# Patient Record
Sex: FEMALE | Race: WHITE | NOT HISPANIC OR LATINO | Employment: OTHER | URBAN - METROPOLITAN AREA
[De-identification: names, ages, dates, MRNs, and addresses within clinical notes are randomized per-mention and may not be internally consistent; named-entity substitution may affect disease eponyms.]

---

## 2017-01-03 ENCOUNTER — TRANSCRIBE ORDERS (OUTPATIENT)
Dept: ADMINISTRATIVE | Facility: HOSPITAL | Age: 67
End: 2017-01-03

## 2017-01-03 DIAGNOSIS — R52 PAIN: Primary | ICD-10-CM

## 2017-01-03 DIAGNOSIS — R60.9 SWELLING: ICD-10-CM

## 2017-01-05 ENCOUNTER — HOSPITAL ENCOUNTER (OUTPATIENT)
Dept: RADIOLOGY | Facility: HOSPITAL | Age: 67
Discharge: HOME | End: 2017-01-05
Attending: INTERNAL MEDICINE
Payer: COMMERCIAL

## 2017-01-05 DIAGNOSIS — R52 PAIN: ICD-10-CM

## 2017-01-05 DIAGNOSIS — R60.9 SWELLING: ICD-10-CM

## 2017-01-05 PROCEDURE — 93970 EXTREMITY STUDY: CPT

## 2017-12-27 ENCOUNTER — TRANSCRIBE ORDERS (OUTPATIENT)
Dept: ADMINISTRATIVE | Facility: HOSPITAL | Age: 67
End: 2017-12-27

## 2017-12-27 DIAGNOSIS — I87.2 VENOUS INSUFFICIENCY: Primary | ICD-10-CM

## 2018-01-10 ENCOUNTER — HOSPITAL ENCOUNTER (OUTPATIENT)
Dept: RADIOLOGY | Facility: HOSPITAL | Age: 68
Discharge: HOME | End: 2018-01-10
Payer: COMMERCIAL

## 2018-01-10 ENCOUNTER — GENERIC CONVERSION - ENCOUNTER (OUTPATIENT)
Dept: OTHER | Facility: OTHER | Age: 68
End: 2018-01-10

## 2018-01-10 DIAGNOSIS — I87.2 VENOUS INSUFFICIENCY: ICD-10-CM

## 2018-01-10 PROCEDURE — 93970 EXTREMITY STUDY: CPT

## 2021-04-08 DIAGNOSIS — Z23 ENCOUNTER FOR IMMUNIZATION: ICD-10-CM

## 2021-10-28 ENCOUNTER — TELEPHONE (OUTPATIENT)
Dept: GASTROENTEROLOGY | Facility: CLINIC | Age: 71
End: 2021-10-28

## 2021-11-16 ENCOUNTER — IMMUNIZATIONS (OUTPATIENT)
Dept: FAMILY MEDICINE CLINIC | Facility: HOSPITAL | Age: 71
End: 2021-11-16

## 2021-11-16 DIAGNOSIS — Z23 ENCOUNTER FOR IMMUNIZATION: Primary | ICD-10-CM

## 2021-11-16 PROCEDURE — 0064A COVID-19 MODERNA VACC 0.25 ML BOOSTER: CPT

## 2021-11-16 PROCEDURE — 91306 COVID-19 MODERNA VACC 0.25 ML BOOSTER: CPT

## 2022-07-12 ENCOUNTER — TELEPHONE (OUTPATIENT)
Dept: DERMATOLOGY | Facility: CLINIC | Age: 72
End: 2022-07-12

## 2022-09-14 ENCOUNTER — APPOINTMENT (OUTPATIENT)
Dept: RADIOLOGY | Facility: HOSPITAL | Age: 72
End: 2022-09-14
Payer: COMMERCIAL

## 2022-09-14 ENCOUNTER — HOSPITAL ENCOUNTER (EMERGENCY)
Facility: HOSPITAL | Age: 72
Discharge: HOME | End: 2022-09-14
Attending: ORTHOPAEDIC SURGERY
Payer: COMMERCIAL

## 2022-09-14 ENCOUNTER — HOSPITAL ENCOUNTER (EMERGENCY)
Facility: HOSPITAL | Age: 72
End: 2022-09-14
Attending: EMERGENCY MEDICINE
Payer: COMMERCIAL

## 2022-09-14 VITALS
SYSTOLIC BLOOD PRESSURE: 139 MMHG | HEART RATE: 72 BPM | DIASTOLIC BLOOD PRESSURE: 65 MMHG | TEMPERATURE: 97.6 F | RESPIRATION RATE: 19 BRPM | OXYGEN SATURATION: 98 %

## 2022-09-14 VITALS
OXYGEN SATURATION: 92 % | TEMPERATURE: 98.1 F | SYSTOLIC BLOOD PRESSURE: 185 MMHG | HEART RATE: 70 BPM | DIASTOLIC BLOOD PRESSURE: 81 MMHG | RESPIRATION RATE: 20 BRPM

## 2022-09-14 DIAGNOSIS — S62.109A WRIST FRACTURE: Primary | ICD-10-CM

## 2022-09-14 PROCEDURE — 73110 X-RAY EXAM OF WRIST: CPT

## 2022-09-14 PROCEDURE — 99285 EMERGENCY DEPT VISIT HI MDM: CPT | Performed by: PHYSICIAN ASSISTANT

## 2022-09-14 PROCEDURE — 96375 TX/PRO/DX INJ NEW DRUG ADDON: CPT

## 2022-09-14 PROCEDURE — 29125 APPL SHORT ARM SPLINT STATIC: CPT | Performed by: PHYSICIAN ASSISTANT

## 2022-09-14 PROCEDURE — 96361 HYDRATE IV INFUSION ADD-ON: CPT

## 2022-09-14 PROCEDURE — 99284 EMERGENCY DEPT VISIT MOD MDM: CPT

## 2022-09-14 PROCEDURE — 96374 THER/PROPH/DIAG INJ IV PUSH: CPT

## 2022-09-14 PROCEDURE — 96376 TX/PRO/DX INJ SAME DRUG ADON: CPT

## 2022-09-14 PROCEDURE — NC001 PR NO CHARGE: Performed by: ORTHOPAEDIC SURGERY

## 2022-09-14 RX ORDER — HYDROMORPHONE HCL/PF 1 MG/ML
1 SYRINGE (ML) INJECTION ONCE
Status: COMPLETED | OUTPATIENT
Start: 2022-09-14 | End: 2022-09-14

## 2022-09-14 RX ORDER — ONDANSETRON 2 MG/ML
4 INJECTION INTRAMUSCULAR; INTRAVENOUS ONCE
Status: COMPLETED | OUTPATIENT
Start: 2022-09-14 | End: 2022-09-14

## 2022-09-14 RX ORDER — HYDROMORPHONE HCL/PF 1 MG/ML
0.5 SYRINGE (ML) INJECTION ONCE
Status: COMPLETED | OUTPATIENT
Start: 2022-09-14 | End: 2022-09-14

## 2022-09-14 RX ORDER — OXYCODONE HYDROCHLORIDE 10 MG/1
10 TABLET ORAL EVERY 4 HOURS PRN
Status: DISCONTINUED | OUTPATIENT
Start: 2022-09-14 | End: 2022-09-14 | Stop reason: HOSPADM

## 2022-09-14 RX ORDER — LIDOCAINE HYDROCHLORIDE 10 MG/ML
10 INJECTION, SOLUTION EPIDURAL; INFILTRATION; INTRACAUDAL; PERINEURAL ONCE
Status: COMPLETED | OUTPATIENT
Start: 2022-09-14 | End: 2022-09-14

## 2022-09-14 RX ADMIN — ONDANSETRON 4 MG: 2 INJECTION INTRAMUSCULAR; INTRAVENOUS at 11:10

## 2022-09-14 RX ADMIN — HYDROMORPHONE HYDROCHLORIDE 1 MG: 1 INJECTION, SOLUTION INTRAMUSCULAR; INTRAVENOUS; SUBCUTANEOUS at 10:04

## 2022-09-14 RX ADMIN — ONDANSETRON 4 MG: 2 INJECTION INTRAMUSCULAR; INTRAVENOUS at 11:44

## 2022-09-14 RX ADMIN — LIDOCAINE HYDROCHLORIDE 10 ML: 10 INJECTION, SOLUTION EPIDURAL; INFILTRATION; INTRACAUDAL; PERINEURAL at 13:35

## 2022-09-14 RX ADMIN — SODIUM CHLORIDE 1000 ML: 0.9 INJECTION, SOLUTION INTRAVENOUS at 10:07

## 2022-09-14 RX ADMIN — HYDROMORPHONE HYDROCHLORIDE 0.5 MG: 1 INJECTION, SOLUTION INTRAMUSCULAR; INTRAVENOUS; SUBCUTANEOUS at 12:24

## 2022-09-14 RX ADMIN — OXYCODONE HYDROCHLORIDE 10 MG: 10 TABLET ORAL at 16:18

## 2022-09-14 NOTE — ED NOTES
Patient is leaving with an IV access in place on the left hand,  Transport made aware that the receiving RN is aware that patient will be given pain med through IV before leaving [facility]   Daughter is also aware of the transfer  Report given to Joel Ruiz RN at [facility] AND [facility] ED       Andreas Caballero RN  09/14/22 8182

## 2022-09-14 NOTE — CONSULTS
Orthopedics   Marielos Gleason 71 y o  female MRN: [id number]  Unit/Bed#: ED 27      Chief Complaint:   right wrist pain    HPI:   71 y o  right hand dominant female status post fall complaining of right wrist pain  Patient was in her house this morning, when she had a mechanical fall, lost her balance, and fell onto her right side  Patient had her right arm outstretched to break the fall  In doing so, patient experienced a fracture of her distal radius and ulna  Patient was subsequently unable to bear weight on her right upper extremity  Denies any numbness or tingling  Patient does not take any blood thinners  No pertinent past medical history  Review Of Systems:   · Skin: per HPI and Physical Exam  · Neuro: See HPI  · Musculoskeletal: See HPI  · 14 point review of systems negative except as stated above     Past Medical History:   Past Medical History:   Diagnosis Date    Arthritis        Past Surgical History:   Past Surgical History:   Procedure Laterality Date    HYSTERECTOMY         Family History:  Family history reviewed and non-contributory  History reviewed  No pertinent family history  Social History:  Social History     Socioeconomic History    Marital status: /Civil Union     Spouse name: None    Number of children: None    Years of education: None    Highest education level: None   Occupational History    None   Tobacco Use    Smoking status: Never Smoker    Smokeless tobacco: Never Used   Vaping Use    Vaping Use: Never used   Substance and Sexual Activity    Alcohol use:  Yes     Alcohol/week: 2 0 standard drinks     Types: 2 Glasses of wine per week    Drug use: Never    Sexual activity: Never   Other Topics Concern    None   Social History Narrative    None     Social Determinants of Health     Financial Resource Strain: Not on file   Food Insecurity: Not on file   Transportation Needs: Not on file   Physical Activity: Not on file   Stress: Not on file   Social Connections: Not on file   Intimate Partner Violence: Not on file   Housing Stability: Not on file       Allergies:   No Known Allergies        Labs:  No results found for: HCT, HGB, PT, INR, WBC, ESR, CRP    Meds:  No current facility-administered medications for this encounter  No current outpatient medications on file  Blood Culture:   No results found for: BLOODCX    Wound Culture:   No results found for: WOUNDCULT    Ins and Outs:  No intake/output data recorded  Physical Exam:   There were no vitals taken for this visit  Gen: No acute distress, resting comfortably in bed  HEENT: Eyes clear, moist mucus membranes, hearing intact  Respiratory: No audible wheezing or stridor  Cardiovascular: Well Perfused peripherally  Abdomen: nondistended, no peritoneal signs  Musculoskeletal: right upper extremity  · Skin intact  · Visible deformity  · Edema present to affected area   · Tender to palpation over forearm  · Sensation intact to median, radial, and ulnar distributions  · Motor intact to ain/pin/m/r/u  · Finger tips warm and well perfused  · Musculature is soft and compressible    Radiology:   I personally reviewed the films  X-rays AP/Lateral right wrist shows dorsally displaced distal radius and ulna fractures  Procedure: Distal radius reduction and splint application    A hematoma block was given with 20cc of 1% lidocaine without epi  Once adequate anesthesia was obtained, a gentle closed reduction maneuver was performed and pt was placed in a well padded sugartong splint  Pt tolerated the procedure well and was neurovascularly intact both pre and post procedure  Post reduction orthogonal x rays will be reviewed upon completion    Assessment:  71 y  o female S/P fall with right distal radius and ulna fractures  Patient can be discharged from the ED and follow up in 1 week with Dr Tatianna Lynn for discussion of potential operative vs  nonoperative treatment       Plan:   · Non weight bearing right upper extremity in sugartong splint  · Discharged from ED with oral pain meds and instruction to f/u with Dr Kwabena Martinez in 1 week   · Instructed to return to ED if new numbness or tingling in fingers, pain that is out of control despite oral pain meds, or if fingers turn pale and cold  · Analgesics for pain   · Ice and elevation  · Dispo: Ok for discharge from [address] [place] [address] Rd, MD

## 2022-09-14 NOTE — ED PROVIDER NOTES
History  Chief Complaint   Patient presents with    Wrist Injury     Pt tripped and fell this morning, injuring right wrist   Denies LOC, denies hitting her head, denies blood thinners      80-year-old female presenting today for evaluation of right-sided wrist pain after she had a trip and fall this morning onto outstretched hands  Did not injure any other portion of her body that she knows of  Not hit her head lose consciousness  Is not on any blood thinners  Notes some numbness and tingling to the right fingers, primarily the right 2nd and 3rd digits  Pain with movement of fingers  None       History reviewed  No pertinent past medical history  History reviewed  No pertinent surgical history  History reviewed  No pertinent family history  I have reviewed and agree with the history as documented  E-Cigarette/Vaping     E-Cigarette/Vaping Substances          Review of Systems   Constitutional: Negative  Negative for chills, fatigue and fever  HENT: Negative  Negative for congestion, postnasal drip, rhinorrhea and sore throat  Eyes: Negative  Respiratory: Negative  Negative for cough, shortness of breath and wheezing  Cardiovascular: Negative  Gastrointestinal: Negative  Negative for abdominal pain, diarrhea, nausea and vomiting  Endocrine: Negative  Genitourinary: Negative  Musculoskeletal: Positive for arthralgias and joint swelling  Negative for back pain, gait problem, myalgias, neck pain and neck stiffness  Skin: Negative  Neurological: Negative  Hematological: Negative  Psychiatric/Behavioral: Negative  All other systems reviewed and are negative  Physical Exam  Physical Exam  Vitals and nursing note reviewed  Constitutional:       Appearance: Normal appearance  HENT:      Head: Normocephalic and atraumatic        Right Ear: External ear normal       Left Ear: External ear normal       Nose: Nose normal    Eyes:      Conjunctiva/sclera: Conjunctivae normal    Cardiovascular:      Rate and Rhythm: Normal rate  Pulses: Normal pulses  Pulmonary:      Effort: Pulmonary effort is normal       Comments: S PO2 is 95% indicating adequate oxygenation  Abdominal:      General: There is no distension  Musculoskeletal:         General: No deformity  Normal range of motion  Arms:       Cervical back: Normal range of motion  Skin:     General: Skin is warm and dry  Capillary Refill: Capillary refill takes less than 2 seconds  Findings: No rash  Neurological:      General: No focal deficit present  Mental Status: She is alert and oriented to person, place, and time  Mental status is at baseline  Psychiatric:         Mood and Affect: Mood normal          Behavior: Behavior normal          Thought Content:  Thought content normal          Judgment: Judgment normal          Vital Signs  ED Triage Vitals [09/14/22 0950]   Temperature Pulse Respirations Blood Pressure SpO2   98 5 °F (36 9 °C) 74 20 151/74 95 %      Temp Source Heart Rate Source Patient Position - Orthostatic VS BP Location FiO2 (%)   Tympanic Monitor Sitting Left arm --      Pain Score       10 - Worst Possible Pain           Vitals:    09/14/22 0950 09/14/22 1147   BP: 151/74 (!) 185/81   Pulse: 74 70   Patient Position - Orthostatic VS: Sitting          Visual Acuity      ED Medications  Medications   HYDROmorphone (DILAUDID) injection 1 mg (1 mg Intravenous Given 9/14/22 1004)   sodium chloride 0 9 % bolus 1,000 mL (0 mL Intravenous Stopped 9/14/22 1129)   ondansetron (ZOFRAN) injection 4 mg (4 mg Intravenous Given 9/14/22 1110)   ondansetron (ZOFRAN) injection 4 mg (4 mg Intravenous Given 9/14/22 1144)   HYDROmorphone (DILAUDID) injection 0 5 mg (0 5 mg Intravenous Given 9/14/22 1224)       Diagnostic Studies  Results Reviewed     None                 XR wrist 3+ views RIGHT   Final Result by Amara Stokes MD (09/14 1037)      Acute distal radius and ulnar fractures with substantial dorsal displacement          Workstation performed: [id number]                    Procedures  Splint application    Date/Time: 9/14/2022 12:39 PM  Performed by: Elisa Norris PA-C  Authorized by: Elisa Norris PA-C   Universal Protocol:  Consent: Verbal consent obtained  Risks and benefits: risks, benefits and alternatives were discussed  Consent given by: patient  Time out: Immediately prior to procedure a "time out" was called to verify the correct patient, procedure, equipment, support staff and site/side marked as required  Timeout called at: 9/14/2022 12:39 PM   Patient understanding: patient states understanding of the procedure being performed  Patient consent: the patient's understanding of the procedure matches consent given  Procedure consent: procedure consent matches procedure scheduled  Relevant documents: relevant documents present and verified  Test results: test results available and properly labeled  Site marked: the operative site was marked  Radiology Images displayed and confirmed  If images not available, report reviewed: imaging studies available  Required items: required blood products, implants, devices, and special equipment available  Patient identity confirmed: verbally with patient      Pre-procedure details:     Sensation:  Unchanged  Procedure details:     Laterality:  Right    Location:  Wrist    Splint type:  Volar short arm    Supplies:  Cotton padding, elastic bandage and Ortho-Glass  Post-procedure details:     Pain:  Unchanged    Sensation:  Unchanged    Patient tolerance of procedure: Tolerated well, no immediate complications             ED Course  ED Course as of 09/14/22 1241   Wed Sep 14, 2022   [address] [place] [address] Spoke with Dr Sona Sanchez  Reaching out to Hand on call  Discussed with Dr Kishan Abbott      0 Spoke with Dr Ron Carpenter, he requests transfer    21 [phone] EMS pickup 12:30                               SBIRT 22yo+ Flowsheet Row Most Recent Value   SBIRT (25 yo +)    In order to provide better care to our patients, we are screening all of our patients for alcohol and drug use  Would it be okay to ask you these screening questions? No Filed at: 09/14/2022 1120                    [facility]  Number of Diagnoses or Management Options  Wrist fracture  Diagnosis management comments: Discussed case with Dr Emily Cummings who recommended hand  Discussed case with Dr Clark Hanley requested patient be transferred for evaluation at [facility] ED  Patient is in agreement with this  Volar splint loosely applied for stability  Discussed case with attending Dr Aranza Pandya  Amount and/or Complexity of Data Reviewed  Tests in the radiology section of CPT®: ordered and reviewed  Review and summarize past medical records: yes  Discuss the patient with other providers: yes (Dr Emily Cummings, Dr Clark Hanley, Dr Aranza Pandya )  Independent visualization of images, tracings, or specimens: yes        Disposition  Final diagnoses:   Wrist fracture     Time reflects when diagnosis was documented in both MDM as applicable and the Disposition within this note     Time User Action Codes Description Comment    9/14/2022 11:09 AM Sonya Aparicio Add [H30 109A] Wrist fracture       ED Disposition     ED Disposition   Transfer to Another Facility-In Network    Condition   --    Date/Time   Wed Sep 14, 2022 11:09 AM    Comment   Yenny Barahona should be transferred out to [facility] ED             MD Documentation    Sienna Pepper Most Recent Value   Patient Condition The patient has been stabilized such that within reasonable medical probability, no material deterioration of the patient condition or the condition of the unborn child(marcia) is likely to result from the transfer   Reason for Transfer Level of Care needed not available at this facility   Benefits of Transfer Specialized equipment and/or services available at the receiving facility (Include comment)________________________   Risks of Transfer Potential for delay in receiving treatment, Potential deterioration of medical condition, Loss of IV, Increased discomfort during transfer, Possible worsening of condition or death during transfer, Other: (Include comment)__________________________  Jeannett Papa or vascular injury]   Accepting Physician [facility]   Sending MD Dr Rickey Mensah [facility]   Provider Certification General risk, such as traffic hazards, adverse weather conditions, rough terrain or turbulence, possible failure of equipment (including vehicle or aircraft), or consequences of actions of persons outside the control of the transport personnel, Unanticipated needs of medical equipment and personnel during transport, Risk of worsening condition, The possibility of a transport vehicle being unavailable      RN Documentation    [facility]   Bed Assignment [facility] ED   Report Given to Merrick Porter RN   Medications Reviewed with Next Provider of Service No   Transport Mode Ambulance   Level of Care Basic life support   Copies of Medical Records Sent Other (comment)  [accepting facility will view it in epic]   Patient Belongings Disposition Sent with patient   Transfer Date 09/14/22   Transfer Time 1230      Follow-up Information    None         There are no discharge medications for this patient  No discharge procedures on file      PDMP Review     None          ED Provider  Electronically Signed by           Shelby Pichardo PA-C  09/14/22 HERIBERTO Juarez  09/14/22 1241

## 2022-09-14 NOTE — DISCHARGE INSTRUCTIONS
Discharge Instructions - Orthopedics  Girmagermain Del Rosario 71 y o  female MRN: [id number]  Unit/Bed#: X ray    Weight Bearing Status:                                           Do not bear weight on your right arm  Do not remove the splint it is placed in     Pain:  Continue analgesics as directed  Dressing Instructions:   Please keep clean, dry and intact until follow up  Appt Instructions: If you do not have your appointment, please call the clinic at [phone]  Contact the office sooner if you experience any increased numbness/tingling in the extremities

## 2022-09-14 NOTE — EMTALA/ACUTE CARE TRANSFER
[address] [address]  Dept: [phone]      EMTALA TRANSFER CONSENT    NAME Yenny Barahona                                         1950                              MRN [id number]    I have been informed of my rights regarding examination, treatment, and transfer   by Dr Aj Estrada MD    Benefits: Specialized equipment and/or services available at the receiving facility (Include comment)________________________    Risks: Potential for delay in receiving treatment, Potential deterioration of medical condition, Loss of IV, Increased discomfort during transfer, Possible worsening of condition or death during transfer, Other: (Include comment)__________________________ (nerve or vascular injury)      Transfer Request   I acknowledge that my medical condition has been evaluated and explained to me by the emergency department physician or other qualified medical person and/or my attending physician who has recommended and offered to me further medical examination and treatment  I understand the Hospital's obligation with respect to the treatment and stabilization of my emergency medical condition  I nevertheless request to be transferred  I release the Hospital, the doctor, and any other persons caring for me from all responsibility or liability for any injury or ill effects that may result from my transfer and agree to accept all responsibility for the consequences of my choice to transfer, rather than receive stabilizing treatment at the Hospital  I understand that because the transfer is my request, my insurance may not provide reimbursement for the services  The Hospital will assist and direct me and my family in how to make arrangements for transfer, but the hospital is not liable for any fees charged by the transport service    In spite of this understanding, I refuse to consent to further medical examination and treatment which has been offered to me, and request transfer to [address] [facility]  I authorize the performance of emergency medical procedures and treatments upon me in both transit and upon arrival at the receiving facility  Additionally, I authorize the release of any and all medical records to the receiving facility and request they be transported with me, if possible  I authorize the performance of emergency medical procedures and treatments upon me in both transit and upon arrival at the receiving facility  Additionally, I authorize the release of any and all medical records to the receiving facility and request they be transported with me, if possible  I understand that the safest mode of transportation during a medical emergency is an ambulance and that the Hospital advocates the use of this mode of transport  Risks of traveling to the receiving facility by car, including absence of medical control, life sustaining equipment, such as oxygen, and medical personnel has been explained to me and I fully understand them  (STEPHANE CORRECT BOX BELOW)  [  ]  I consent to the stated transfer and to be transported by ambulance/helicopter  [  ]  I consent to the stated transfer, but refuse transportation by ambulance and accept full responsibility for my transportation by car  I understand the risks of non-ambulance transfers and I exonerate the Hospital and its staff from any deterioration in my condition that results from this refusal     X___________________________________________    DATE  22  TIME________  Signature of patient or legally responsible individual signing on patient behalf           RELATIONSHIP TO PATIENT_________________________          Provider Certification    NAME Keyonna Jamey VELOZ 1950                              MRN [id number]    A medical screening exam was performed on the above named patient    Based on the examination:    Condition Necessitating Transfer The encounter diagnosis was Wrist fracture  Patient Condition: The patient has been stabilized such that within reasonable medical probability, no material deterioration of the patient condition or the condition of the unborn child(marcia) is likely to result from the transfer    Reason for Transfer: Level of Care needed not available at this facility    Transfer Requirements: [address] [facility] ED   · Space available and qualified personnel available for treatment as acknowledged by    · Agreed to accept transfer and to provide appropriate medical treatment as acknowledged by       Dr Isak Knight  · Appropriate medical records of the examination and treatment of the patient are provided at the time of transfer   [address] [facility], [address] _______  · Transfer will be performed by qualified personnel from    and appropriate transfer equipment as required, including the use of necessary and appropriate life support measures      Provider Certification: I have examined the patient and explained the following risks and benefits of being transferred/refusing transfer to the patient/family:  General risk, such as traffic hazards, adverse weather conditions, rough terrain or turbulence, possible failure of equipment (including vehicle or aircraft), or consequences of actions of persons outside the control of the transport personnel, Unanticipated needs of medical equipment and personnel during transport, Risk of worsening condition, The possibility of a transport vehicle being unavailable      Based on these reasonable risks and benefits to the patient and/or the unborn child(marcia), and based upon the information available at the time of the patients examination, I certify that the medical benefits reasonably to be expected from the provision of appropriate medical treatments at another medical facility outweigh the increasing risks, if any, to the individuals medical condition, and in the case of labor to the unborn child, from effecting the transfer      X____________________________________________ DATE 09/14/22        TIME_______      ORIGINAL - SEND TO MEDICAL RECORDS   COPY - SEND WITH PATIENT DURING TRANSFER

## 2022-09-14 NOTE — ED NOTES
Writer attempted to give report to [facility] AND [facility] ED but has been put on hold twice for a while   time by [facility] is 12:30 noon with Dr [place] Loredo accepting physician  Charge RN here is aware, Wally Chaudhary will try again  later       Marla Plunkett RN  09/14/22 5771

## 2022-09-14 NOTE — ED NOTES
Carmel GILBERT at bedside stabilizing the affected area with ace wrap before transfer to another facility for higher level of care       Ginny Gabriel RN  09/14/22 1979

## 2022-09-14 NOTE — ED NOTES
Ortho at bedside, new x-ray needed prior to discharge  X-ray called        Jaspal Plunkett RN  09/14/22 1040

## 2022-09-15 ENCOUNTER — TELEPHONE (OUTPATIENT)
Dept: OBGYN CLINIC | Facility: HOSPITAL | Age: 72
End: 2022-09-15

## 2022-09-19 ENCOUNTER — TELEPHONE (OUTPATIENT)
Dept: OBGYN CLINIC | Facility: HOSPITAL | Age: 72
End: 2022-09-19

## 2022-09-19 NOTE — TELEPHONE ENCOUNTER
Hello,    Please see force on request below:    Name: Pauleen Kehr    : 52    MRN: [id number]    #:     Insurance: [address] [place] [address]       Reason for Appt: R Wrist Improved alignment of distal radial/ulnar fractures to near anatomical     Requesting Doctor/Location: Sarai Roe    Can leave a detail message, with an appt date and time  Soonest available slot      Patient in hospital with , if not answer        Thank You,

## 2022-09-21 ENCOUNTER — HOSPITAL ENCOUNTER (OUTPATIENT)
Dept: RADIOLOGY | Facility: HOSPITAL | Age: 72
Discharge: HOME | End: 2022-09-21
Attending: ORTHOPAEDIC SURGERY
Payer: COMMERCIAL

## 2022-09-21 ENCOUNTER — OFFICE VISIT (OUTPATIENT)
Dept: OBGYN CLINIC | Facility: HOSPITAL | Age: 72
End: 2022-09-21
Payer: COMMERCIAL

## 2022-09-21 VITALS
BODY MASS INDEX: 32.21 KG/M2 | OXYGEN SATURATION: 98 % | WEIGHT: 170.6 LBS | SYSTOLIC BLOOD PRESSURE: 142 MMHG | HEIGHT: 61 IN | DIASTOLIC BLOOD PRESSURE: 80 MMHG | HEART RATE: 72 BPM

## 2022-09-21 DIAGNOSIS — S62.101A CLOSED FRACTURE OF RIGHT WRIST, INITIAL ENCOUNTER: Primary | ICD-10-CM

## 2022-09-21 DIAGNOSIS — T14.8XXA FRACTURE: ICD-10-CM

## 2022-09-21 PROCEDURE — 99203 OFFICE O/P NEW LOW 30 MIN: CPT | Performed by: ORTHOPAEDIC SURGERY

## 2022-09-21 PROCEDURE — 73110 X-RAY EXAM OF WRIST: CPT

## 2022-09-21 RX ORDER — FLUTICASONE PROPIONATE AND SALMETEROL 500; 50 UG/1; UG/1
POWDER RESPIRATORY (INHALATION)
COMMUNITY
Start: 2022-07-18

## 2022-09-21 RX ORDER — ALBUTEROL SULFATE 90 UG/1
1-2 AEROSOL, METERED RESPIRATORY (INHALATION)
COMMUNITY
Start: 2022-06-03

## 2022-09-21 RX ORDER — TRAZODONE HYDROCHLORIDE 50 MG/1
TABLET ORAL
COMMUNITY
Start: 2022-08-25

## 2022-09-21 RX ORDER — LEVOTHYROXINE SODIUM 0.05 MG/1
50 TABLET ORAL DAILY
COMMUNITY
Start: 2022-06-25

## 2022-09-21 RX ORDER — LEVOCETIRIZINE DIHYDROCHLORIDE 5 MG/1
TABLET, FILM COATED ORAL
COMMUNITY
Start: 2022-06-23

## 2022-09-21 RX ORDER — CHLORAL HYDRATE 500 MG
1 CAPSULE ORAL DAILY
COMMUNITY

## 2022-09-21 RX ORDER — ALBUTEROL SULFATE 90 UG/1
AEROSOL, METERED RESPIRATORY (INHALATION)
COMMUNITY
Start: 2022-06-30

## 2022-09-21 RX ORDER — VALSARTAN AND HYDROCHLOROTHIAZIDE 160; 12.5 MG/1; MG/1
TABLET, FILM COATED ORAL
COMMUNITY
Start: 2022-08-24

## 2022-09-21 RX ORDER — ROSUVASTATIN CALCIUM 20 MG/1
1 TABLET, COATED ORAL DAILY
COMMUNITY
Start: 2022-08-15

## 2022-09-21 NOTE — PROGRESS NOTES
ASSESSMENT/PLAN:    Assessment:   Right distal radius and ulna fractures status post reduction, 9/14/2022    Plan:   · Diagnostics reviewed and physical exam performed  Diagnosis, treatment options and associated risks were discussed with the patient including no treatment, nonsurgical treatment and potential for surgical intervention  The patient was given the opportunity to ask questions regarding each  · Upon her radiographs taken today her fractures have held up with the reduction in her splint  Recommend repeat x-rays in 1 week in the splint  Discussed possible surgical intervention if her fractures displace  Patient wishes non operative management at this time  · Sling for comfort only      Follow Up:  Return in about 1 week (around 9/28/2022) for re-check with x-rays right wrist        To Do Next Visit:  X-rays right wrist in splint    General Discussions:     Fracture - Nonoperative Care: The physiology of a fractured bone was discussed with the patient today  With nondisplaced or minimally displaced fractures, conservative treatment often results in a functional recovery  Typically, these fractures are immobilized in either a cast or splint depending on the pattern  Radiographs are typically taken at intervals throughout the fracture healing to ensure that muscular forces do not cause loss of reduction or alignment  If the fracture loses its alignment, surgical intervention may be required to stabilize it  Medical conditions such as diabetes, osteoporosis, vitamin D deficiency, and a history of or exposure to smoking may delay or prevent fracture healing      Operative Discussions:  Discussed but not scheduled    _____________________________________________________  CHIEF COMPLAINT:  Chief Complaint   Patient presents with    Right Wrist - Fracture     Reduced 9/14/22         SUBJECTIVE:  Grant Chua is a 71 y o  female who presents with her daughter for office evaluation of her right wrist due to pain  She fell at home on 09/14/2022  She was seen through worn the ER and then transported to [facility] where her wrist fractures were reduced and splinted  She is in a sugar-tong splint  She has been wearing her sling however states that the sling is more uncomfortable than her splint  She denies any numbness or tingling  PAST MEDICAL HISTORY:  Past Medical History:   Diagnosis Date    Arthritis        PAST SURGICAL HISTORY:  Past Surgical History:   Procedure Laterality Date    HYSTERECTOMY         FAMILY HISTORY:  History reviewed  No pertinent family history  SOCIAL HISTORY:  Social History     Tobacco Use    Smoking status: Never Smoker    Smokeless tobacco: Never Used   Vaping Use    Vaping Use: Never used   Substance Use Topics    Alcohol use:  Yes     Alcohol/week: 2 0 standard drinks     Types: 2 Glasses of wine per week    Drug use: Never       MEDICATIONS:    Current Outpatient Medications:     albuterol (PROVENTIL HFA,VENTOLIN HFA) 90 mcg/act inhaler, Inhale 1-2 puffs, Disp: , Rfl:     rosuvastatin (CRESTOR) 20 MG tablet, Take 1 tablet by mouth daily, Disp: , Rfl:     albuterol (PROVENTIL HFA,VENTOLIN HFA) 90 mcg/act inhaler, , Disp: , Rfl:     ascorbic acid (VITAMIN C) 1000 MG tablet, Take 1,000 mg by mouth daily, Disp: , Rfl:     Cholecalciferol 50 MCG (2000 UT) CAPS, Take 1 capsule by mouth daily, Disp: , Rfl:     halobetasol (ULTRAVATE) 0 05 % cream, Apply 1 application topically 2 (two) times a day, Disp: , Rfl:     levocetirizine (XYZAL) 5 MG tablet, , Disp: , Rfl:     levothyroxine 50 mcg tablet, Take 50 mcg by mouth daily, Disp: , Rfl:     MILK THISTLE PO, Take 1 tablet by mouth daily, Disp: , Rfl:     Omega-3 Fatty Acids (fish oil) 1,000 mg, Take 1 capsule by mouth daily, Disp: , Rfl:     sertraline (ZOLOFT) 50 mg tablet, , Disp: , Rfl:     traZODone (DESYREL) 50 mg tablet, , Disp: , Rfl:     valsartan-hydrochlorothiazide (DIOVAN-HCT) 160-12 5 MG per tablet, , Disp: , Rfl:     Wixela Inhub 500-50 MCG/ACT inhaler, , Disp: , Rfl:     ALLERGIES:  No Known Allergies    REVIEW OF SYSTEMS:  Pertinent items are noted in HPI  A comprehensive review of systems was negative  LABS:  HgA1c: No results found for: HGBA1C  BMP: No results found for: GLUCOSE, CALCIUM, NA, K, CO2, CL, BUN, CREATININE      _____________________________________________________  PHYSICAL EXAMINATION:  Vital signs: /80   Pulse 72   Ht 5' 1" (1 549 m)   Wt 77 4 kg (170 lb 9 6 oz)   SpO2 98%   BMI 32 23 kg/m²   General: well developed and well nourished, alert, oriented times 3 and appears comfortable  Psychiatric: Normal  HEENT: Trachea Midline, No torticollis  Cardiovascular: No discernable arrhythmia  Pulmonary: No wheezing or stridor  Abdomen: No rebound or guarding  Extremities: No peripheral edema  Skin:   Neurovascular: Sensation Intact to the Median, Ulnar, Radial Nerve, Motor Intact to the Median, Ulnar, Radial Nerve and Pulses Intact      MUSCULOSKELETAL EXAMINATION:    Right upper extremity:  Sugar-tong splint intact  Modest swelling of her fingers with good capillary refill     _____________________________________________________  STUDIES REVIEWED:  Images were reviewed in PACS by Dr Ambar Whitehead and demonstrate:  Right wrist x-rays taken and reviewed in the office today show:  Comminuted distal radius and ulna fractures remain in reasonable/acceptable position status post reduction    Splint artifact noted      PROCEDURES PERFORMED:  Procedures  No Procedures performed today       Scribe Attestation    I,:  Jazzy Reyes am acting as a scribe while in the presence of the attending physician :       I,:  Shadi Meadows MD personally performed the services described in this documentation    as scribed in my presence :           Scribe Attestation    I,:  Jazzy Reyes am acting as a scribe while in the presence of the attending physician :       I,:  Shadi Meadows MD personally performed the services described in this documentation    as scribed in my presence :

## 2022-09-27 ENCOUNTER — TELEPHONE (OUTPATIENT)
Dept: OBGYN CLINIC | Facility: HOSPITAL | Age: 72
End: 2022-09-27

## 2022-09-27 NOTE — TELEPHONE ENCOUNTER
Patient has an appt this Friday 9/30 with Amie Brothers   She stated she cannot make that time and would like a different time that day or a different day        CB: [phone]

## 2022-10-07 ENCOUNTER — OFFICE VISIT (OUTPATIENT)
Dept: OBGYN CLINIC | Facility: HOSPITAL | Age: 72
End: 2022-10-07
Payer: COMMERCIAL

## 2022-10-07 ENCOUNTER — HOSPITAL ENCOUNTER (OUTPATIENT)
Dept: RADIOLOGY | Facility: HOSPITAL | Age: 72
Discharge: HOME | End: 2022-10-07
Payer: COMMERCIAL

## 2022-10-07 VITALS
WEIGHT: 170 LBS | SYSTOLIC BLOOD PRESSURE: 128 MMHG | HEART RATE: 71 BPM | DIASTOLIC BLOOD PRESSURE: 77 MMHG | HEIGHT: 61 IN | BODY MASS INDEX: 32.1 KG/M2

## 2022-10-07 DIAGNOSIS — S62.101A CLOSED FRACTURE OF RIGHT WRIST, INITIAL ENCOUNTER: Primary | ICD-10-CM

## 2022-10-07 DIAGNOSIS — S62.101A CLOSED FRACTURE OF RIGHT WRIST, INITIAL ENCOUNTER: ICD-10-CM

## 2022-10-07 PROCEDURE — 99213 OFFICE O/P EST LOW 20 MIN: CPT | Performed by: PHYSICIAN ASSISTANT

## 2022-10-07 PROCEDURE — 73110 X-RAY EXAM OF WRIST: CPT

## 2022-10-07 NOTE — PROGRESS NOTES
ASSESSMENT/PLAN:    Assessment:   Right distal radius and ulna fractures status post reduction, 9/14/2022    Plan:   X-rays were reviewed with the patient today  It was discussed that there is slight impaction of the radius and the ulna bone but still maintaining alignment  Conservative and surgical options were discussed with the patient and we will continue conservative treatment at this time   She was placed into a long-arm cast  Cast precautions were given  She will follow-up in 1 week for x-rays in cast        Follow Up:  Return in about 1 week (around 10/14/2022)  To Do Next Visit:  X-rays right wrist in splint    General Discussions:     Fracture - Nonoperative Care: The physiology of a fractured bone was discussed with the patient today  With nondisplaced or minimally displaced fractures, conservative treatment often results in a functional recovery  Typically, these fractures are immobilized in either a cast or splint depending on the pattern  Radiographs are typically taken at intervals throughout the fracture healing to ensure that muscular forces do not cause loss of reduction or alignment  If the fracture loses its alignment, surgical intervention may be required to stabilize it  Medical conditions such as diabetes, osteoporosis, vitamin D deficiency, and a history of or exposure to smoking may delay or prevent fracture healing  Operative Discussions:  Discussed but not scheduled    _____________________________________________________  CHIEF COMPLAINT:  Chief Complaint   Patient presents with    Right Wrist - Fracture, Follow-up         SUBJECTIVE:  Rubi Viera is a 71 y o  female who presents with her daughter for office evaluation of her right wrist due to pain  She fell at home on 09/14/2022  Fractures were reduced in the ER in [facility]  She is in a sugar-tong splint    At her previous office visit new x-rays were obtained which demonstrated similar alignment compared to post reduction films  She maintain the splint  Conservative and operative treatment were discussed with the patient and she elected on conservative treatment  Today she presents with the splint in appropriate position  New x-rays were obtained  She states that there is some discomfort in the splint  Splint was removed today and long-arm cast was applied  PAST MEDICAL HISTORY:  Past Medical History:   Diagnosis Date    Arthritis        PAST SURGICAL HISTORY:  Past Surgical History:   Procedure Laterality Date    HYSTERECTOMY         FAMILY HISTORY:  History reviewed  No pertinent family history  SOCIAL HISTORY:  Social History     Tobacco Use    Smoking status: Never Smoker    Smokeless tobacco: Never Used   Vaping Use    Vaping Use: Never used   Substance Use Topics    Alcohol use:  Yes     Alcohol/week: 2 0 standard drinks     Types: 2 Glasses of wine per week    Drug use: Never       MEDICATIONS:    Current Outpatient Medications:     albuterol (PROVENTIL HFA,VENTOLIN HFA) 90 mcg/act inhaler, , Disp: , Rfl:     ascorbic acid (VITAMIN C) 1000 MG tablet, Take 1,000 mg by mouth daily, Disp: , Rfl:     Cholecalciferol 50 MCG (2000 UT) CAPS, Take 1 capsule by mouth daily, Disp: , Rfl:     halobetasol (ULTRAVATE) 0 05 % cream, Apply 1 application topically 2 (two) times a day, Disp: , Rfl:     levocetirizine (XYZAL) 5 MG tablet, , Disp: , Rfl:     MILK THISTLE PO, Take 1 tablet by mouth daily, Disp: , Rfl:     Omega-3 Fatty Acids (fish oil) 1,000 mg, Take 1 capsule by mouth daily, Disp: , Rfl:     rosuvastatin (CRESTOR) 20 MG tablet, Take 1 tablet by mouth daily, Disp: , Rfl:     sertraline (ZOLOFT) 50 mg tablet, , Disp: , Rfl:     traZODone (DESYREL) 50 mg tablet, , Disp: , Rfl:     valsartan-hydrochlorothiazide (DIOVAN-HCT) 160-12 5 MG per tablet, , Disp: , Rfl:     Wixela Inhub 500-50 MCG/ACT inhaler, , Disp: , Rfl:     albuterol (PROVENTIL HFA,VENTOLIN HFA) 90 mcg/act inhaler, Inhale 1-2 puffs, Disp: , Rfl:     levothyroxine 50 mcg tablet, Take 50 mcg by mouth daily, Disp: , Rfl:     ALLERGIES:  No Known Allergies    REVIEW OF SYSTEMS:  Pertinent items are noted in HPI  A comprehensive review of systems was negative  LABS:  HgA1c: No results found for: HGBA1C  BMP: No results found for: GLUCOSE, CALCIUM, NA, K, CO2, CL, BUN, CREATININE      _____________________________________________________  PHYSICAL EXAMINATION:  Vital signs: /77   Pulse 71   Ht 5' 1" (1 549 m)   Wt 77 1 kg (170 lb)   BMI 32 12 kg/m²   General: well developed and well nourished, alert, oriented times 3 and appears comfortable  Psychiatric: Normal  HEENT: Trachea Midline, No torticollis  Cardiovascular: No discernable arrhythmia  Pulmonary: No wheezing or stridor  Abdomen: No rebound or guarding  Extremities: No peripheral edema  Skin:   Neurovascular: Sensation Intact to the Median, Ulnar, Radial Nerve, Motor Intact to the Median, Ulnar, Radial Nerve and Pulses Intact      MUSCULOSKELETAL EXAMINATION:    Right upper extremity:  Sugar-tong splint intact  Swelling is noted over the digits  Capillary refill less than 2 seconds    _____________________________________________________  STUDIES REVIEWED:  Images were reviewed in PACS which demonstrate:  Right wrist x-rays taken and reviewed in the office today show:  Comminuted distal radius and ulna fractures remain in reasonable/acceptable position status post reduction    Splint artifact noted      PROCEDURES PERFORMED:  Procedures  No Procedures performed today

## 2022-10-19 ENCOUNTER — HOSPITAL ENCOUNTER (OUTPATIENT)
Dept: RADIOLOGY | Facility: HOSPITAL | Age: 72
Discharge: HOME | End: 2022-10-19
Attending: ORTHOPAEDIC SURGERY
Payer: COMMERCIAL

## 2022-10-19 ENCOUNTER — OFFICE VISIT (OUTPATIENT)
Dept: OBGYN CLINIC | Facility: HOSPITAL | Age: 72
End: 2022-10-19
Payer: COMMERCIAL

## 2022-10-19 VITALS
SYSTOLIC BLOOD PRESSURE: 136 MMHG | HEIGHT: 61 IN | WEIGHT: 170 LBS | BODY MASS INDEX: 32.1 KG/M2 | HEART RATE: 73 BPM | DIASTOLIC BLOOD PRESSURE: 85 MMHG

## 2022-10-19 DIAGNOSIS — S62.101A CLOSED FRACTURE OF RIGHT WRIST, INITIAL ENCOUNTER: ICD-10-CM

## 2022-10-19 DIAGNOSIS — S62.101A CLOSED FRACTURE OF RIGHT WRIST, INITIAL ENCOUNTER: Primary | ICD-10-CM

## 2022-10-19 PROCEDURE — 73110 X-RAY EXAM OF WRIST: CPT

## 2022-10-19 PROCEDURE — 99214 OFFICE O/P EST MOD 30 MIN: CPT | Performed by: ORTHOPAEDIC SURGERY

## 2022-10-19 NOTE — PATIENT INSTRUCTIONS
Cast Care Tips    Keep Cast Dry  Cover when showering  Make sure water does not run down the limb into the cover  Trash bag  with medical tape or cast cover”  If upper extremity is casted, hold above your head to keep water from cover opening  Avoid scratching/putting objects in the cast, or sliding/shifting your limb inside the cast  No - pens, pencils, hangers, etc   Instead - tap the surface of the cast using you hands or fingertips  Use a blow dryer on the cool setting to blow air into the cast  Scratching can cause an unreachable break in the skin, or if something gets stuck against your skin, it can lead to skin irritation and infection  Things to look out for  Pain - The injury site is protected, it should no longer cause pain  Paresthesia - Numbness or tingling sensations can be indicative of pressure on a nerve, and/or inflammation  Pulse - Poor circulation might be caused by swelling or cast being wrapped too tight   Indicators include change in color of fingers or toes (blue or pale), numbness, and/or skin being cold to touch  Pressure - Feeling of being too tight” without visible signs of swelling  Swelling - Diminished appearance of joint creases, bulging appearance either above (closer to the torso) or below (farther from the torso) the cast  If any of these things happen:   Elevate the cast above the heart  Sit with your arm above your heart or lay down with your leg elevated (i e  propped on pillows, the arm of the couch, etc )  If your upper extremity is casted, hold the opposite shoulder  If symptoms do not subside, or worsen even after taking the aforementioned measures, contact the Physician's office, or seek immediate medical attention  Call for cast check if:  The cast feels loose   Two or more fingers fit in either end of cast  Cast gets wet  Cast starts to smell  Something gets stuck inside the cast  You experience any, or all, of the things to look out for”   Driving Precautions - Depending on your type of cast, affected side, and personal conditions, driving may be discouraged  Please follow guidelines set by your Doctor  Call the office if you have any questions

## 2022-11-09 ENCOUNTER — OFFICE VISIT (OUTPATIENT)
Dept: OCCUPATIONAL THERAPY | Facility: HOSPITAL | Age: 72
End: 2022-11-09
Attending: ORTHOPAEDIC SURGERY

## 2022-11-09 ENCOUNTER — HOSPITAL ENCOUNTER (OUTPATIENT)
Dept: RADIOLOGY | Facility: HOSPITAL | Age: 72
Discharge: HOME | End: 2022-11-09
Attending: ORTHOPAEDIC SURGERY

## 2022-11-09 ENCOUNTER — OFFICE VISIT (OUTPATIENT)
Dept: OBGYN CLINIC | Facility: HOSPITAL | Age: 72
End: 2022-11-09

## 2022-11-09 VITALS
SYSTOLIC BLOOD PRESSURE: 130 MMHG | BODY MASS INDEX: 32.1 KG/M2 | WEIGHT: 170 LBS | HEART RATE: 78 BPM | DIASTOLIC BLOOD PRESSURE: 81 MMHG | HEIGHT: 61 IN

## 2022-11-09 DIAGNOSIS — S62.101A CLOSED FRACTURE OF RIGHT WRIST, INITIAL ENCOUNTER: ICD-10-CM

## 2022-11-09 DIAGNOSIS — S62.101D CLOSED FRACTURE OF RIGHT WRIST WITH ROUTINE HEALING, SUBSEQUENT ENCOUNTER: Primary | ICD-10-CM

## 2022-11-09 DIAGNOSIS — M25.531 RIGHT WRIST PAIN: Primary | ICD-10-CM

## 2022-11-09 RX ORDER — CHLORHEXIDINE GLUCONATE 0.12 MG/ML
RINSE ORAL
COMMUNITY
Start: 2022-11-01

## 2022-11-09 RX ORDER — AMOXICILLIN AND CLAVULANATE POTASSIUM 875; 125 MG/1; MG/1
TABLET, FILM COATED ORAL
COMMUNITY
Start: 2022-11-07

## 2022-11-09 RX ORDER — NIRMATRELVIR AND RITONAVIR 300-100 MG
KIT ORAL
COMMUNITY
Start: 2022-10-24

## 2022-11-09 NOTE — PROGRESS NOTES
Orthosis    Diagnosis:   1  Right wrist pain       Indication: Fracture    Location: Right  wrist and hand  Supplies: Custom Fit Orthotic  Orthosis type: Volar Hand-Wrist  Wearing Schedule: Remove for hygiene only  Describe Position: Wrist in neutral    Precautions: Fracture and Universal (skin contact/breakdown)    Patient or Caregiver expresses understanding of wearing Schedule and Precautions? Yes  Patient or Caregiver able to don/doff orthotic independently? Yes    Written orders provided to patient?  No  Orders Obtained: Written  Orders Obtained from: [facility]    Return for evaluation and treatment No Reports she would like to only do HEP right now

## 2022-11-09 NOTE — PROGRESS NOTES
ASSESSMENT/PLAN:    Assessment:   Right distal radius and ulna fractures status post reduction, 9/14/2022    Plan:   X-rays reviewed with the patient at today's visit  A script for occupational therapy was provided to the patient at today's visit for a custom volar wrist brace and a home exercise program  She was instructed to wear the brace when out in the community  No lifting greater than 10 pounds for the next 4-6 weeks    Follow Up:  8  week(s)    To Do Next Visit:  Reassess current condition/range of motion    _____________________________________________________  CHIEF COMPLAINT:  Chief Complaint   Patient presents with   • Right Wrist - Fracture, Follow-up         SUBJECTIVE:  Layo Platt is a 71 y o  female who presents for follow up regarding close treatment for a right distal radius fracture, DOI 9/14/2022  She has been compliant with the use of the short-arm cast   She states her pain is well controlled  She denies any numbness or tingling  PAST MEDICAL HISTORY:  Past Medical History:   Diagnosis Date   • Arthritis        PAST SURGICAL HISTORY:  Past Surgical History:   Procedure Laterality Date   • HYSTERECTOMY         FAMILY HISTORY:  History reviewed  No pertinent family history  SOCIAL HISTORY:  Social History     Tobacco Use   • Smoking status: Never Smoker   • Smokeless tobacco: Never Used   Vaping Use   • Vaping Use: Never used   Substance Use Topics   • Alcohol use:  Yes     Alcohol/week: 2 0 standard drinks     Types: 2 Glasses of wine per week   • Drug use: Never       MEDICATIONS:    Current Outpatient Medications:   •  albuterol (PROVENTIL HFA,VENTOLIN HFA) 90 mcg/act inhaler, Inhale 1-2 puffs, Disp: , Rfl:   •  amoxicillin-clavulanate (AUGMENTIN) 875-125 mg per tablet, , Disp: , Rfl:   •  ascorbic acid (VITAMIN C) 1000 MG tablet, Take 1,000 mg by mouth daily, Disp: , Rfl:   •  chlorhexidine (PERIDEX) 0 12 % solution, RINSE WITH 1/2 OZ TWO TIMES A DAY AFTER BREAKFAST AND BEFORE BEDTIME AFTER BRUSHING AND FLOSSING, Disp: , Rfl:   •  Cholecalciferol 50 MCG (2000 UT) CAPS, Take 1 capsule by mouth daily, Disp: , Rfl:   •  halobetasol (ULTRAVATE) 0 05 % cream, Apply 1 application topically 2 (two) times a day, Disp: , Rfl:   •  levocetirizine (XYZAL) 5 MG tablet, , Disp: , Rfl:   •  levothyroxine 50 mcg tablet, Take 50 mcg by mouth daily, Disp: , Rfl:   •  MILK THISTLE PO, Take 1 tablet by mouth daily, Disp: , Rfl:   •  Omega-3 Fatty Acids (fish oil) 1,000 mg, Take 1 capsule by mouth daily, Disp: , Rfl:   •  Paxlovid, 300/100, tablet therapy pack, take 2 NIRMATRELVIR tablets with 1 RITONAVIR tablet twice a day for 5 days, Disp: , Rfl:   •  rosuvastatin (CRESTOR) 20 MG tablet, Take 1 tablet by mouth daily, Disp: , Rfl:   •  sertraline (ZOLOFT) 50 mg tablet, , Disp: , Rfl:   •  traZODone (DESYREL) 50 mg tablet, , Disp: , Rfl:   •  valsartan-hydrochlorothiazide (DIOVAN-HCT) 160-12 5 MG per tablet, , Disp: , Rfl:   •  Wixela Inhub 500-50 MCG/ACT inhaler, , Disp: , Rfl:     ALLERGIES:  No Known Allergies    REVIEW OF SYSTEMS:  Pertinent items are noted in HPI  A comprehensive review of systems was negative      LABS:  HgA1c: No results found for: HGBA1C  BMP: No results found for: GLUCOSE, CALCIUM, NA, K, CO2, CL, BUN, CREATININE        _____________________________________________________  PHYSICAL EXAMINATION:  Vital signs: /81   Pulse 78   Ht 5' 1" (1 549 m)   Wt 77 1 kg (170 lb)   BMI 32 12 kg/m²   General: well developed and well nourished, alert, oriented times 3 and appears comfortable  Psychiatric: Normal  HEENT: Trachea Midline, No torticollis  Cardiovascular: No discernable arrhythmia  Pulmonary: No wheezing or stridor  Abdomen: No rebound or guarding  Extremities: No peripheral edema  Skin: No masses, erythema, lacerations, fluctation, ulcerations  Neurovascular: Sensation Intact to the Median, Ulnar, Radial Nerve, Motor Intact to the Median, Ulnar, Radial Nerve and Pulses Intact    MUSCULOSKELETAL EXAMINATION:  RIGHT SIDE:  Wrist:  Nontender to palpation distal radius and ulnar styloid, full composite fist    _____________________________________________________  STUDIES REVIEWED:  Images were reviewed in PACS by Dr Elvis Nolan and demonstrate:  Interval signs of healing with callus formation of a comminuted distal radius and ulna fractures with maintained alignment        PROCEDURES PERFORMED:  Procedures  No Procedures performed today      Scribe Attestation    I,:  Trey Beth am acting as a scribe while in the presence of the attending physician :       I,:  Jil Sykes MD personally performed the services described in this documentation    as scribed in my presence :         Teresa Partida,:  Trey Beth am acting as a scribe while in the presence of the attending physician :       I,:  Jil Sykes MD personally performed the services described in this documentation    as scribed in my presence :

## 2023-01-06 ENCOUNTER — TELEPHONE (OUTPATIENT)
Dept: OBGYN CLINIC | Facility: HOSPITAL | Age: 73
End: 2023-01-06

## 2023-01-06 DIAGNOSIS — S62.101D CLOSED FRACTURE OF RIGHT WRIST WITH ROUTINE HEALING, SUBSEQUENT ENCOUNTER: Primary | ICD-10-CM

## 2023-01-06 NOTE — TELEPHONE ENCOUNTER
Please advise the patient that a Occupational Therapy prescription was placed into the chart. She can schedule appointment at her convenience. Thank you.

## 2023-01-06 NOTE — TELEPHONE ENCOUNTER
Caller: Patient    Doctor: [facility]    Reason for call: Patient would like a script for PT in [address]    Call back#: [phone]

## 2023-01-11 ENCOUNTER — OFFICE VISIT (OUTPATIENT)
Dept: OBGYN CLINIC | Facility: HOSPITAL | Age: 73
End: 2023-01-11

## 2023-01-11 VITALS
WEIGHT: 170 LBS | HEIGHT: 61 IN | DIASTOLIC BLOOD PRESSURE: 88 MMHG | BODY MASS INDEX: 32.1 KG/M2 | SYSTOLIC BLOOD PRESSURE: 162 MMHG | HEART RATE: 78 BPM

## 2023-01-11 DIAGNOSIS — S62.101D CLOSED FRACTURE OF RIGHT WRIST WITH ROUTINE HEALING, SUBSEQUENT ENCOUNTER: Primary | ICD-10-CM

## 2023-01-11 NOTE — PROGRESS NOTES
ASSESSMENT/PLAN:    Assessment:   Right distal radius and ulna fractures status post reduction, 9/14/2022    Plan:   Patient was advised to begin OT at this time  Patient was advised to monitor the numbness into her right index finger and if this improves once she regains the ROM of her wrist  Due to her apb having full strength we will monitor this at this time  She expressed understanding  Follow Up:  8  week(s)    _____________________________________________________  CHIEF COMPLAINT:  Chief Complaint   Patient presents with   • Right Wrist - Fracture, Follow-up     DOI- 9/14/2022         SUBJECTIVE:  Chandler Redmond is a 71 y o  female who presents for follow up regarding Right distal radius and ulna fractures status post reduction, 9/14/2022  Since last visit, Chandler Redmond has tried Resume activities as tolerated with only partial relief  Today there is Stiffness/LROM to the right wrist and hand  Radiation: Yes to the  wrist  Associated symptoms: Numbness to her right index finger    PAST MEDICAL HISTORY:  Past Medical History:   Diagnosis Date   • Arthritis        PAST SURGICAL HISTORY:  Past Surgical History:   Procedure Laterality Date   • HYSTERECTOMY         FAMILY HISTORY:  History reviewed  No pertinent family history  SOCIAL HISTORY:  Social History     Tobacco Use   • Smoking status: Never   • Smokeless tobacco: Never   Vaping Use   • Vaping Use: Never used   Substance Use Topics   • Alcohol use:  Yes     Alcohol/week: 2 0 standard drinks     Types: 2 Glasses of wine per week   • Drug use: Never       MEDICATIONS:    Current Outpatient Medications:   •  albuterol (PROVENTIL HFA,VENTOLIN HFA) 90 mcg/act inhaler, Inhale 1-2 puffs, Disp: , Rfl:   •  amoxicillin-clavulanate (AUGMENTIN) 875-125 mg per tablet, , Disp: , Rfl:   •  ascorbic acid (VITAMIN C) 1000 MG tablet, Take 1,000 mg by mouth daily, Disp: , Rfl:   •  chlorhexidine (PERIDEX) 0 12 % solution, RINSE WITH 1/2 OZ TWO TIMES A DAY AFTER BREAKFAST AND BEFORE BEDTIME AFTER BRUSHING AND FLOSSING, Disp: , Rfl:   •  Cholecalciferol 50 MCG (2000 UT) CAPS, Take 1 capsule by mouth daily, Disp: , Rfl:   •  halobetasol (ULTRAVATE) 0 05 % cream, Apply 1 application topically 2 (two) times a day, Disp: , Rfl:   •  levocetirizine (XYZAL) 5 MG tablet, , Disp: , Rfl:   •  levothyroxine 50 mcg tablet, Take 50 mcg by mouth daily, Disp: , Rfl:   •  MILK THISTLE PO, Take 1 tablet by mouth daily, Disp: , Rfl:   •  Omega-3 Fatty Acids (fish oil) 1,000 mg, Take 1 capsule by mouth daily, Disp: , Rfl:   •  Paxlovid, 300/100, tablet therapy pack, take 2 NIRMATRELVIR tablets with 1 RITONAVIR tablet twice a day for 5 days, Disp: , Rfl:   •  rosuvastatin (CRESTOR) 20 MG tablet, Take 1 tablet by mouth daily, Disp: , Rfl:   •  sertraline (ZOLOFT) 50 mg tablet, , Disp: , Rfl:   •  traZODone (DESYREL) 50 mg tablet, , Disp: , Rfl:   •  valsartan-hydrochlorothiazide (DIOVAN-HCT) 160-12 5 MG per tablet, , Disp: , Rfl:   •  Wixela Inhub 500-50 MCG/ACT inhaler, , Disp: , Rfl:     ALLERGIES:  No Known Allergies    REVIEW OF SYSTEMS:  Pertinent items are noted in HPI      LABS:  HgA1c: No results found for: HGBA1C  BMP: No results found for: GLUCOSE, CALCIUM, NA, K, CO2, CL, BUN, CREATININE        _____________________________________________________  PHYSICAL EXAMINATION:  Vital signs: /88   Pulse 78   Ht 5' 1" (1 549 m)   Wt 77 1 kg (170 lb)   BMI 32 12 kg/m²   General: well developed and well nourished, alert, oriented times 3 and appears comfortable  Psychiatric: Normal  HEENT: Trachea Midline, No torticollis  Cardiovascular: No discernable arrhythmia  Pulmonary: No wheezing or stridor  Abdomen: No rebound or guarding  Extremities: No peripheral edema  Skin: No masses, erythema, lacerations, fluctation, ulcerations  Neurovascular: Sensation Intact to the Median, Ulnar, Radial Nerve, Motor Intact to the Median, Ulnar, Radial Nerve and Pulses Intact    MUSCULOSKELETAL EXAMINATION:  RIGHT SIDE:  wrist: wrist flexion 45 degrees, wrist extension 30, limited supination due to stiffness 40 degrees, full pronation, apb 5/5, negative tinels to carpal tunnel     _____________________________________________________  STUDIES REVIEWED:  No Studies to review      PROCEDURES PERFORMED:  Procedures  No Procedures performed today   Scribe Attestation    I,:  Lianne Nye am acting as a scribe while in the presence of the attending physician :       I,:  Leopold Mitts, MD personally performed the services described in this documentation    as scribed in my presence :

## 2023-03-13 ENCOUNTER — TELEPHONE (OUTPATIENT)
Dept: OBGYN CLINIC | Facility: HOSPITAL | Age: 73
End: 2023-03-13

## 2023-03-13 NOTE — TELEPHONE ENCOUNTER
Caller: Patient    Doctor: Dr Jimmy Zhang    Reason for call: Patient calling to schedule appointment with hand/wrist surgeon  Warm transfer to hand/wrist         Call back#: [phone]

## 2023-03-31 ENCOUNTER — OFFICE VISIT (OUTPATIENT)
Dept: OBGYN CLINIC | Facility: HOSPITAL | Age: 73
End: 2023-03-31

## 2023-03-31 VITALS
DIASTOLIC BLOOD PRESSURE: 82 MMHG | SYSTOLIC BLOOD PRESSURE: 133 MMHG | HEIGHT: 61 IN | HEART RATE: 77 BPM | BODY MASS INDEX: 32.4 KG/M2 | WEIGHT: 171.6 LBS

## 2023-03-31 DIAGNOSIS — S62.101A CLOSED FRACTURE OF RIGHT WRIST, INITIAL ENCOUNTER: Primary | ICD-10-CM

## 2023-03-31 NOTE — PROGRESS NOTES
ASSESSMENT/PLAN:    Assessment:   Right distal radius and ulna fractures status post reduction, 9/14/2022    Plan: It was discussed with the patient to begin therapy as she has it scheduled on 4/13/2023  She was encouraged to follow her symptoms of her numbness and tingling into the thumb and index finger as she states that there is some improvement since her last visit  She will follow-up in 2 to 3 months for reevaluation    Follow Up:  2-3 months    _____________________________________________________  CHIEF COMPLAINT:  Chief Complaint   Patient presents with   • Right Wrist - Fracture, Follow-up     DOI- 9/14/2022         SUBJECTIVE:  Artur Henry is a 71 y o  female who presents for follow up regarding Right distal radius and ulna fractures status post reduction, 9/14/2022  She was supposed to start some physical therapy to work on range of motion, stretching and strengthening however has been unable to do so due to personal issues  She states she has been working on some range of motion exercises that were given to her in the office  She states she has noted some improvement in her numbness and tingling but will still get into the thumb and index finger    PAST MEDICAL HISTORY:  Past Medical History:   Diagnosis Date   • Arthritis        PAST SURGICAL HISTORY:  Past Surgical History:   Procedure Laterality Date   • HYSTERECTOMY         FAMILY HISTORY:  History reviewed  No pertinent family history  SOCIAL HISTORY:  Social History     Tobacco Use   • Smoking status: Never   • Smokeless tobacco: Never   Vaping Use   • Vaping Use: Never used   Substance Use Topics   • Alcohol use:  Yes     Alcohol/week: 2 0 standard drinks     Types: 2 Glasses of wine per week   • Drug use: Never       MEDICATIONS:    Current Outpatient Medications:   •  albuterol (PROVENTIL HFA,VENTOLIN HFA) 90 mcg/act inhaler, Inhale 1-2 puffs, Disp: , Rfl:   •  ascorbic acid (VITAMIN C) 1000 MG tablet, Take 1,000 mg by mouth daily, "Disp: , Rfl:   •  chlorhexidine (PERIDEX) 0 12 % solution, RINSE WITH 1/2 OZ TWO TIMES A DAY AFTER BREAKFAST AND BEFORE BEDTIME AFTER BRUSHING AND FLOSSING, Disp: , Rfl:   •  Cholecalciferol 50 MCG (2000 UT) CAPS, Take 1 capsule by mouth daily, Disp: , Rfl:   •  halobetasol (ULTRAVATE) 0 05 % cream, Apply 1 application topically 2 (two) times a day, Disp: , Rfl:   •  levocetirizine (XYZAL) 5 MG tablet, , Disp: , Rfl:   •  levothyroxine 50 mcg tablet, Take 50 mcg by mouth daily, Disp: , Rfl:   •  MILK THISTLE PO, Take 1 tablet by mouth daily, Disp: , Rfl:   •  Omega-3 Fatty Acids (fish oil) 1,000 mg, Take 1 capsule by mouth daily, Disp: , Rfl:   •  rosuvastatin (CRESTOR) 20 MG tablet, Take 1 tablet by mouth daily, Disp: , Rfl:   •  sertraline (ZOLOFT) 50 mg tablet, , Disp: , Rfl:   •  traZODone (DESYREL) 50 mg tablet, , Disp: , Rfl:   •  valsartan-hydrochlorothiazide (DIOVAN-HCT) 160-12 5 MG per tablet, , Disp: , Rfl:   •  Wixela Inhub 500-50 MCG/ACT inhaler, , Disp: , Rfl:   •  amoxicillin-clavulanate (AUGMENTIN) 875-125 mg per tablet, , Disp: , Rfl:   •  Paxlovid, 300/100, tablet therapy pack, take 2 NIRMATRELVIR tablets with 1 RITONAVIR tablet twice a day for 5 days, Disp: , Rfl:     ALLERGIES:  No Known Allergies    REVIEW OF SYSTEMS:  Pertinent items are noted in HPI      LABS:  HgA1c: No results found for: HGBA1C  BMP: No results found for: GLUCOSE, CALCIUM, NA, K, CO2, CL, BUN, CREATININE        _____________________________________________________  PHYSICAL EXAMINATION:  Vital signs: /82   Pulse 77   Ht 5' 1\" (1 549 m)   Wt 77 8 kg (171 lb 9 6 oz)   BMI 32 42 kg/m²   General: well developed and well nourished, alert, oriented times 3 and appears comfortable  Psychiatric: Normal  HEENT: Trachea Midline, No torticollis  Cardiovascular: No discernable arrhythmia  Pulmonary: No wheezing or stridor  Abdomen: No rebound or guarding  Extremities: No peripheral edema  Skin: No masses, erythema, lacerations, " fluctation, ulcerations  Neurovascular: Sensation Intact to the Median, Ulnar, Radial Nerve, Motor Intact to the Median, Ulnar, Radial Nerve and Pulses Intact    MUSCULOSKELETAL EXAMINATION:  RIGHT SIDE:  wrist: wrist flexion 45 degrees, wrist extension 30, limited supination due to stiffness 40 degrees, full pronation, apb 5/5, negative tinels to carpal tunnel     _____________________________________________________  STUDIES REVIEWED:  No Studies to review      PROCEDURES PERFORMED:  Procedures  No Procedures performed today

## 2023-04-27 ENCOUNTER — OFFICE VISIT (OUTPATIENT)
Dept: OCCUPATIONAL THERAPY | Facility: CLINIC | Age: 73
End: 2023-04-27

## 2023-04-27 DIAGNOSIS — S62.101D CLOSED FRACTURE OF RIGHT WRIST WITH ROUTINE HEALING, SUBSEQUENT ENCOUNTER: Primary | ICD-10-CM

## 2023-04-27 NOTE — PROGRESS NOTES
Daily Note     Today's date: 2023  Patient name: Gladys Yen  : 1950  MRN: [id number]  Referring provider: Gia Brunner PA-C  Dx:   Encounter Diagnosis     ICD-10-CM    1  Closed fracture of right wrist with routine healing, subsequent encounter  S62 101D                      Subjective: Patient notes she has been consistent with HEP  Objective: See treatment diary below         Precautions: Universal        Visit Tracker: Ara De Leon  COPAY $15  Date   IE                                                                                   Manuals HEP 2023                        Ther Ex     Education on Dx and HEP x x5min   Wrist flex & ext stretch x x5   Wrist AROM x x10   Forearm Rotation sup & pro  x x10   Hammer pro/sup x x10 2lbs   Wrist PREs x x10 3lb   Therapist assist STM and PROM  x10 min         Ther Activity     juxtaciser  x5   Wrist rotator  x10   theraputty  pinch  x x10 yellow   Digit ext band x x10 red   flexbar all planes  x10 red        Modalities     MH x x5min            Assessment:   Patient tolerated session well  Session focused on HEP education, range of motion, patient education, and strengthening to improve functional task performance with daily activities  Patient tolerated all TE/TA with no complaints  Patient progressing well towards goals  Patient benefiting from skilled hand therapy OT to reduce deficits to improve independence with daily activities  Plan:   Focus on AROM and strengthening to improve ability to complete daily activites with ease   POC: 2023 - 2023

## 2023-05-04 ENCOUNTER — OFFICE VISIT (OUTPATIENT)
Dept: OCCUPATIONAL THERAPY | Facility: CLINIC | Age: 73
End: 2023-05-04

## 2023-05-04 DIAGNOSIS — S62.101D CLOSED FRACTURE OF RIGHT WRIST WITH ROUTINE HEALING, SUBSEQUENT ENCOUNTER: Primary | ICD-10-CM

## 2023-05-04 NOTE — PROGRESS NOTES
Daily Note     Today's date: 2023  Patient name: Mariya Viera  : 1950  MRN: [id number]  Referring provider: Rosa Adame PA-C  Dx:   Encounter Diagnosis     ICD-10-CM    1  Closed fracture of right wrist with routine healing, subsequent encounter  S62 101D                      Subjective: Patient notes she has been consistent with HEP  Objective: See treatment diary below         Precautions: Universal        Visit Tracker: Tayler Chery  COPAY $15  Date   IE                                                                                  Manuals HEP 2023                        Ther Ex     Education on Dx and HEP x x5min   Wrist flex & ext stretch x x5   Wrist AROM x x10   Forearm Rotation sup & pro  x x10   Hammer pro/sup x x10 2lbs   Wrist PREs x x10 3lb   Therapist assist STM and PROM  x10 min    Weight bearing on scale x x10 5 sec                   Ther Activity     juxtaciser  x5   Wrist rotator  x10   theraputty  pinch, cisers x x10 yellow   Digit ext band x x10 red   flexbar all planes  x10 green        Modalities     MH x x5min            Assessment:   Patient tolerated session well  Session focused on HEP education, range of motion, patient education, and strengthening to improve functional task performance with daily activities  Patient tolerated all TE/TA with no complaints  Patient progressing well towards goals  Patient benefiting from skilled hand therapy OT to reduce deficits to improve independence with daily activities  Plan:   Focus on AROM and strengthening to improve ability to complete daily activites with ease   POC: 2023 - 2023

## 2023-05-11 ENCOUNTER — APPOINTMENT (OUTPATIENT)
Dept: OCCUPATIONAL THERAPY | Facility: CLINIC | Age: 73
End: 2023-05-11
Payer: COMMERCIAL

## 2023-05-18 ENCOUNTER — OFFICE VISIT (OUTPATIENT)
Dept: OCCUPATIONAL THERAPY | Facility: CLINIC | Age: 73
End: 2023-05-18

## 2023-05-18 ENCOUNTER — APPOINTMENT (OUTPATIENT)
Dept: OCCUPATIONAL THERAPY | Facility: CLINIC | Age: 73
End: 2023-05-18
Payer: COMMERCIAL

## 2023-05-18 DIAGNOSIS — S62.101D CLOSED FRACTURE OF RIGHT WRIST WITH ROUTINE HEALING, SUBSEQUENT ENCOUNTER: Primary | ICD-10-CM

## 2023-05-18 NOTE — PROGRESS NOTES
Daily Note     Today's date: 2023  Patient name: Moses Ponce  : 1950  MRN: [id number]  Referring provider: Yvette Damon PA-C  Dx:   Encounter Diagnosis     ICD-10-CM    1  Closed fracture of right wrist with routine healing, subsequent encounter  S62 101D                      Subjective: Patient notes she has been consistent with HEP  Objective: See treatment diary below         Precautions: Universal        Visit Tracker: Cyndi Cons  COPAY $15  Date   IE                                                                                 Manuals HEP 2023                        Ther Ex     Education on Dx and HEP x x5min   Wrist flex & ext stretch x x5   Wrist AROM x x10   Forearm Rotation sup & pro  x x10   Hammer pro/sup x x10 2lbs   Wrist PREs x x10 3lb   Therapist assist STM and PROM  x10 min    Weight bearing on scale x x10 5 sec                   Ther Activity     juxtaciser  x5   Wrist rotator  x10   theraputty  pinch, cisers x x10 yellow   Digit ext band x x10 red   flexbar all planes  x10 green        Modalities     MH x x5min            Assessment:   Patient tolerated session well  Session focused on HEP education, range of motion, patient education, and strengthening to improve functional task performance with daily activities  Patient tolerated all TE/TA with no complaints  Patient progressing well towards goals  Patient benefiting from skilled hand therapy OT to reduce deficits to improve independence with daily activities  Plan:   Focus on AROM and strengthening to improve ability to complete daily activites with ease   POC: 2023 - 2023

## 2023-05-25 ENCOUNTER — OFFICE VISIT (OUTPATIENT)
Dept: OCCUPATIONAL THERAPY | Facility: CLINIC | Age: 73
End: 2023-05-25

## 2023-05-25 DIAGNOSIS — S62.101D CLOSED FRACTURE OF RIGHT WRIST WITH ROUTINE HEALING, SUBSEQUENT ENCOUNTER: Primary | ICD-10-CM

## 2023-05-25 NOTE — PROGRESS NOTES
Daily Note     Today's date: 2023  Patient name: Keisha Ortiz  : 1950  MRN: [id number]  Referring provider: Brian Guevara PA-C  Dx:   Encounter Diagnosis     ICD-10-CM    1  Closed fracture of right wrist with routine healing, subsequent encounter  S62 101D                      Subjective: Patient notes she has been consistent with HEP as well as reports no pain at rest        Objective: See treatment diary below           Visit Tracker: Hussein Álvarezann  COPAY $15  Date   IE                                                                                Manuals HEP 2023                        Ther Ex     Education on Dx and HEP x x5min   Wrist flex & ext stretch x x5   Wrist AROM x x10   Forearm Rotation sup & pro  x x10   Hammer pro/sup x x10 2lbs   Wrist PREs x x10 3lb   Therapist assist STM and PROM  x10 min    Weight bearing on scale x x10 5 sec                   Ther Activity     juxtaciser  x5   Wrist rotator  x10   theraputty  pinch, cisers x x10 yellow   Digit ext band x x10 red   flexbar all planes  x10 green        Modalities     MH x x5min            Assessment:   Patient tolerated session well  Session focused on HEP education, range of motion, patient education, and strengthening to improve functional task performance with daily activities  Patient tolerated all TE/TA with no complaints  Patient progressing well towards goals  Patient benefiting from skilled hand therapy OT to reduce deficits to improve independence with daily activities  Plan:   Focus on AROM and strengthening to improve ability to complete daily activites with ease   POC: 2023 - 2023

## 2023-06-01 ENCOUNTER — OFFICE VISIT (OUTPATIENT)
Dept: OCCUPATIONAL THERAPY | Facility: CLINIC | Age: 73
End: 2023-06-01

## 2023-06-01 DIAGNOSIS — S62.101D CLOSED FRACTURE OF RIGHT WRIST WITH ROUTINE HEALING, SUBSEQUENT ENCOUNTER: Primary | ICD-10-CM

## 2023-06-01 NOTE — PROGRESS NOTES
Daily Note     Today's date: 2023  Patient name: Pradip Hoffmann  : 1950  MRN: [id number]  Referring provider: Suzanne Turner PA-C  Dx:   Encounter Diagnosis     ICD-10-CM    1  Closed fracture of right wrist with routine healing, subsequent encounter  S62 101D                      Subjective: Patient notes she has been consistent with HEP as well as reports no pain at rest        Objective: See treatment diary below           Visit Tracker: Zahraa Das  COPAY $15  Date   IE                                                                            Manuals HEP 2023                        Ther Ex     Education on Dx and HEP x x5min   Wrist flex & ext stretch x x5   Wrist AROM x x10   Forearm Rotation sup & pro  x x10   Hammer pro/sup x x10 2lbs   Wrist PREs x x10 3lb   Therapist assist STM and PROM  x10 min    Weight bearing on scale x x10 5 sec    Prayer stretch  x 5 x 10 sec             Ther Activity     juxtaciser  x5   Wrist rotator  x10   theraputty  pinch, cisers x x10 yellow   Digit ext band x x10 red   flexbar all planes  x10 green        Modalities     MH x x5min            Assessment:   Patient tolerated session well  Session focused on HEP education, range of motion, patient education, and strengthening to improve functional task performance with daily activities  Patient tolerated all TE/TA with no complaints  Patient progressing well towards goals  Patient benefiting from skilled hand therapy OT to reduce deficits to improve independence with daily activities  Plan:   Focus on AROM and strengthening to improve ability to complete daily activites with ease   POC: 2023 - 2023

## 2023-06-08 ENCOUNTER — OFFICE VISIT (OUTPATIENT)
Dept: OCCUPATIONAL THERAPY | Facility: CLINIC | Age: 73
End: 2023-06-08
Payer: COMMERCIAL

## 2023-06-08 DIAGNOSIS — S62.101D CLOSED FRACTURE OF RIGHT WRIST WITH ROUTINE HEALING, SUBSEQUENT ENCOUNTER: Primary | ICD-10-CM

## 2023-06-08 PROCEDURE — 97110 THERAPEUTIC EXERCISES: CPT

## 2023-06-08 PROCEDURE — 97530 THERAPEUTIC ACTIVITIES: CPT

## 2023-06-08 NOTE — PROGRESS NOTES
Daily Note     Today's date: 2023  Patient name: Anjali Urena  : 1950  MRN: [id number]  Referring provider: Gladis Miller PA-C  Dx:   Encounter Diagnosis     ICD-10-CM    1  Closed fracture of right wrist with routine healing, subsequent encounter  S62 101D                      Subjective: Patient notes she has been consistent with HEP as well as reports no pain at rest        Objective: See treatment diary below           Visit Tracker: Media Speak  COPAY $15  Date   IE                                                                           Manuals HEP 2023                        Ther Ex     Education on Dx and HEP x x5min   Wrist flex & ext stretch x x5   Wrist Tenodesis x x20   Forearm Rotation sup & pro  x x10   Hammer pro/sup x x10 2lbs   Wrist PREs x x10 3lb   Therapist assist STM and PROM  x10 min    Weight bearing on scale x x10 5 sec    Prayer stretch  x              Ther Activity     juxtaciser  x5   Wrist rotator  x10   theraputty rolling and pushing with cone x x10 yellow   Digit ext band x    flexbar all planes  x10 green        Modalities     MH x x5min            Assessment:   Patient tolerated session well  Session focused on HEP education, range of motion, patient education, and strengthening to improve functional task performance with daily activities  Patient tolerated all TE/TA with no complaints able to tolerate upgrades in resistance  Patient progressing well towards goals  Patient benefiting from skilled hand therapy OT to reduce deficits to improve independence with daily activities  Plan:   Focus on AROM and strengthening to improve ability to complete daily activites with ease   POC: 2023 - 2023

## 2023-06-15 ENCOUNTER — OFFICE VISIT (OUTPATIENT)
Dept: OCCUPATIONAL THERAPY | Facility: CLINIC | Age: 73
End: 2023-06-15
Payer: COMMERCIAL

## 2023-06-15 DIAGNOSIS — S62.101D CLOSED FRACTURE OF RIGHT WRIST WITH ROUTINE HEALING, SUBSEQUENT ENCOUNTER: Primary | ICD-10-CM

## 2023-06-15 PROCEDURE — 97110 THERAPEUTIC EXERCISES: CPT

## 2023-06-15 PROCEDURE — 97530 THERAPEUTIC ACTIVITIES: CPT

## 2023-06-15 NOTE — PROGRESS NOTES
Daily Note     Today's date: 6/15/2023  Patient name: Deepali Mcnally  : 1950  MRN: [id number]  Referring provider: Cedric Luque PA-C  Dx:   No diagnosis found  Subjective: Patient notes she has been consistent with HEP as well as reports no pain at rest, has been able to lift a 2 lb water kettle  Objective: See treatment diary below         Visit Tracker: Urban Malik  COPAY $15  Date   IE 4/20 4/27 5/4 5/18 5/25    6/1 6/8 6/15                                                                         Manuals HEP 6/15/2023                        Ther Ex     Education on Dx and HEP x x5min   Wrist flex & ext stretch x x5 on red powerweb   Wrist Tenodesis x x20   Forearm Rotation sup & pro  x x10   Hammer pro/sup x x10 2lbs   Wrist PREs x    Therapist assist STM and PROM  x10 min    Weight bearing on scale     Prayer stretch  x              Ther Activity     juxtaciser  x5   Wrist rotator  x10   theraputty rolling and pushing with cone x    Flexbar N's/U's  x10 Red    Sponges Grasp and Release with lvl 1 gripper   1 cycle of all sponges (Y/P/B/G)        Modalities     MH x x5min            Assessment:   Patient tolerated session well  Session focused on HEP education, range of motion, patient education, and strengthening to improve functional task performance with daily activities  Patient tolerated all TE/TA with no complaints able to tolerate upgrades in resistance  Patient progressing well towards goals  Patient benefiting from skilled hand therapy OT to reduce deficits to improve independence with daily activities  Plan:   Focus on AROM and strengthening to improve ability to complete daily activites with ease   POC: 2023 - 2023

## 2023-06-21 ENCOUNTER — OFFICE VISIT (OUTPATIENT)
Dept: OBGYN CLINIC | Facility: HOSPITAL | Age: 73
End: 2023-06-21
Payer: COMMERCIAL

## 2023-06-21 VITALS
SYSTOLIC BLOOD PRESSURE: 153 MMHG | HEIGHT: 61 IN | HEART RATE: 79 BPM | WEIGHT: 171.4 LBS | BODY MASS INDEX: 32.36 KG/M2 | DIASTOLIC BLOOD PRESSURE: 87 MMHG

## 2023-06-21 DIAGNOSIS — S62.101A CLOSED FRACTURE OF RIGHT WRIST, INITIAL ENCOUNTER: Primary | ICD-10-CM

## 2023-06-21 PROCEDURE — 99213 OFFICE O/P EST LOW 20 MIN: CPT | Performed by: PHYSICIAN ASSISTANT

## 2023-06-21 NOTE — PROGRESS NOTES
ASSESSMENT/PLAN:    Assessment:   Right distal radius and ulna fracture status post reduction, DOI 9/14/2022    Plan:   Patient was advised to continue with therapy to work on her range of motion as well as strengthening  She was encouraged to continue her home exercise program  She will follow-up in 3 months for reevaluation    Follow Up:  3 months    To Do Next Visit:  Evaluation, no x-ray needed        _____________________________________________________  CHIEF COMPLAINT:  Chief Complaint   Patient presents with   • Right Wrist - Fracture, Follow-up     DOI- 9/14/2022         SUBJECTIVE:  Pao Mulligan is a 71 y o  female who presents for follow-up regarding right distal radius and ulnar fracture status post reduction DOI 9/14/2022  She has been working on some physical therapy to work on her range of motion and stretching and strengthening  She states that she has noted significant improvement in her range of motion since starting these  She has been performing a home exercise program as well  PAST MEDICAL HISTORY:  Past Medical History:   Diagnosis Date   • Arthritis        PAST SURGICAL HISTORY:  Past Surgical History:   Procedure Laterality Date   • HYSTERECTOMY         FAMILY HISTORY:  History reviewed  No pertinent family history  SOCIAL HISTORY:  Social History     Tobacco Use   • Smoking status: Never   • Smokeless tobacco: Never   Vaping Use   • Vaping Use: Never used   Substance Use Topics   • Alcohol use:  Yes     Alcohol/week: 2 0 standard drinks of alcohol     Types: 2 Glasses of wine per week   • Drug use: Never       MEDICATIONS:    Current Outpatient Medications:   •  albuterol (PROVENTIL HFA,VENTOLIN HFA) 90 mcg/act inhaler, Inhale 1-2 puffs, Disp: , Rfl:   •  ascorbic acid (VITAMIN C) 1000 MG tablet, Take 1,000 mg by mouth daily, Disp: , Rfl:   •  chlorhexidine (PERIDEX) 0 12 % solution, RINSE WITH 1/2 OZ TWO TIMES A DAY AFTER BREAKFAST AND BEFORE BEDTIME AFTER BRUSHING AND FLOSSING, "Disp: , Rfl:   •  Cholecalciferol 50 MCG (2000 UT) CAPS, Take 1 capsule by mouth daily, Disp: , Rfl:   •  halobetasol (ULTRAVATE) 0 05 % cream, Apply 1 application topically 2 (two) times a day, Disp: , Rfl:   •  levocetirizine (XYZAL) 5 MG tablet, , Disp: , Rfl:   •  levothyroxine 50 mcg tablet, Take 50 mcg by mouth daily, Disp: , Rfl:   •  MILK THISTLE PO, Take 1 tablet by mouth daily, Disp: , Rfl:   •  Omega-3 Fatty Acids (fish oil) 1,000 mg, Take 1 capsule by mouth daily, Disp: , Rfl:   •  sertraline (ZOLOFT) 50 mg tablet, , Disp: , Rfl:   •  traZODone (DESYREL) 50 mg tablet, , Disp: , Rfl:   •  valsartan-hydrochlorothiazide (DIOVAN-HCT) 160-12 5 MG per tablet, , Disp: , Rfl:   •  Wixela Inhub 500-50 MCG/ACT inhaler, , Disp: , Rfl:   •  amoxicillin-clavulanate (AUGMENTIN) 875-125 mg per tablet, , Disp: , Rfl:   •  Paxlovid, 300/100, tablet therapy pack, take 2 NIRMATRELVIR tablets with 1 RITONAVIR tablet twice a day for 5 days, Disp: , Rfl:   •  rosuvastatin (CRESTOR) 20 MG tablet, Take 1 tablet by mouth daily, Disp: , Rfl:     ALLERGIES:  No Known Allergies    REVIEW OF SYSTEMS:  Pertinent items are noted in HPI  A comprehensive review of systems was negative      LABS:  HgA1c: No results found for: \"HGBA1C\"  BMP: No results found for: \"GLUCOSE\", \"CALCIUM\", \"NA\", \"K\", \"CO2\", \"CL\", \"BUN\", \"CREATININE\"    _____________________________________________________  PHYSICAL EXAMINATION:  Vital signs: /87   Pulse 79   Ht 5' 1\" (1 549 m)   Wt 77 7 kg (171 lb 6 4 oz)   BMI 32 39 kg/m²   General: well developed and well nourished, alert, oriented times 3 and appears comfortable  Psychiatric: Normal  HEENT: Trachea Midline, No torticollis  Cardiovascular: No discernable arrhythmia  Pulmonary: No wheezing or stridor  Abdomen: No rebound or guarding  Extremities: No peripheral edema  Skin: No masses, erythema, lacerations, fluctation, ulcerations  Neurovascular: Sensation Intact to the Median, Ulnar, Radial Nerve, " Motor Intact to the Median, Ulnar, Radial Nerve and Pulses Intact    MUSCULOSKELETAL EXAMINATION:  Right wrist  No erythema edema or ecchymosis noted, skin is warm to touch  No tenderness to palpation over the fracture sites  Wrist flexion 50 degrees, wrist extension 35 degrees, full pronation, limited supination to 40 degrees, negative Tinel's over carpal tunnel    _____________________________________________________  STUDIES REVIEWED:  No Studies to review      PROCEDURES PERFORMED:  Procedures  No Procedures performed today

## 2023-06-22 ENCOUNTER — OFFICE VISIT (OUTPATIENT)
Dept: OCCUPATIONAL THERAPY | Facility: CLINIC | Age: 73
End: 2023-06-22
Payer: COMMERCIAL

## 2023-06-22 DIAGNOSIS — S62.101D CLOSED FRACTURE OF RIGHT WRIST WITH ROUTINE HEALING, SUBSEQUENT ENCOUNTER: Primary | ICD-10-CM

## 2023-06-22 PROCEDURE — 97530 THERAPEUTIC ACTIVITIES: CPT

## 2023-06-22 PROCEDURE — 97110 THERAPEUTIC EXERCISES: CPT

## 2023-06-22 NOTE — PROGRESS NOTES
OT Re-Evaluation     Today's date: 2023  Patient name: Jesus Barraza  : 1950  MRN: [id number]  Referring provider: German Mcdermott PA-C  Dx:   Encounter Diagnosis     ICD-10-CM    1  Closed fracture of right wrist with routine healing, subsequent encounter  S62 101D           Start Time: 1100  Stop Time: 1145  Total time in clinic (min): 45 minutes    Assessment  Patient is a 71 y o  RHD female who presents for OT RE Right distal radius and ulna fractures status post reduction, 2022, conservative treatment  Patient tolerated session well with upgrades in resistive activities  Progress note measurements taken this session in which pt  Presents with increased ROM with decreased pain, however still limited with strength in RUE  Session focused on range of motion, patient education in proper body mechanics in daily activities as well as strengthening to improve functional task performance with daily activities  Patient tolerated all TE/TA with no complaints able to tolerate upgrades in resistance  Patient progressing well towards goals  Patient benefiting from skilled hand therapy OT to reduce deficits to improve independence with daily activities  Impairments: abnormal or restricted ROM, activity intolerance, impaired physical strength, lacks appropriate home exercise program and pain with function  Understanding of Dx/Px/POC: good   Prognosis: good    Plan  Patient would benefit from: OT eval and skilled occupational therapy  Planned modality interventions: unattended electrical stimulation, thermotherapy: hydrocollator packs and cryotherapy  Planned therapy interventions: patient education, manual therapy, massage, joint mobilization, therapeutic exercise, therapeutic activities, strengthening, stretching, graded activity, graded exercise, flexibility, fine motor coordination training, functional ROM exercises and home exercise program  Frequency: 1x-2x/week    Duration in weeks: 10  Plan of Care beginning date: 2023  Plan of Care expiration date: 2023  Treatment plan discussed with: patient        Subjective Evaluation    History of Present Illness  Date of onset: 2022  Mechanism of injury: surgery  Mechanism of injury: Patient is a 71 y o  RHD female who presents for OT IE and treatment for Right distal radius and ulna fractures status post reduction, 2022, conservative treatment  Patient reports on 2023 she fell at her house  Patient was seen at [facility] ED on 2023 where the fracture was reduced and patient was placed in a sugar tong splint  Transfer to  ED same day to be examined by Dr Jeaneth Puente, has been under the care of Dr Jeaneth Puente  Conservative treatment was intiated regarding casting and splinting  Patient further reports difficulty with certain ROM when completing daily activities  Patient notes numbness in the thumb and index finger which has occurred since the fall in 2022  Patient referred by Sancho Hawthorne PA-C to initiate treatment including hand therapy for stretching, ROM and strengthening       Quality of life: good    Pain  Current pain ratin  At best pain ratin  At worst pain ratin  Quality: sharp, radiating and throbbing    Social Support  Lives with: spouse    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)  Hand dominance: right    Patient Goals  Patient goals for therapy: increased motion, return to sport/leisure activities and increased strength        Objective     Active Range of Motion   Left Elbow   Forearm supination: 90 degrees   Forearm pronation: 90 degrees     Right Elbow   Forearm supination: 68 degrees   Forearm pronation: 80 degrees     Left Wrist   Wrist flexion: 76 degrees   Wrist extension: 74 degrees   Radial deviation: 24 degrees   Ulnar deviation: 30 degrees     Right Wrist   Wrist flexion: 60 degrees   Wrist extension: 55 degrees   Radial deviation: 12 degrees   Ulnar deviation: 30 degrees Strength/Myotome Testing     Left Wrist/Hand      (2nd hand position)     Trial 1: 55    Thumb Strength  Key/Lateral Pinch     Trial 1: 12    Right Wrist/Hand      (2nd hand position)     Trial 1: 35    Thumb Strength   Key/Lateral Pinch     Trial 1: 14                  Visit Tracker: Ashwin MCDONALD $15  Date 4/13  IE 4/20 4/27 5/4 5/18 5/25    6/1 6/8 6/15 6/22  RE                                                                        Manuals HEP 6/22/2023                        Ther Ex     Education on Dx and HEP x x5min   Wrist flex & ext stretch x x5 on red powerweb   Hammer pro/sup x x10 2lbs   Wrist PREs x 3 lbs ways 10x   Therapist assist STM and PROM  x10 min    Prayer stretch x                   Ther Activity     juxtaciser  x5   Wrist rotator     theraputty rolling and pushing with cone x    Flexbar N's/U's  x10 Green   Sponges Grasp and Release with lvl 2 gripper   1 cycle of all sponges (Y/P/B/G)        Modalities     MH x x5min            Assessment:   Patient tolerated session well with upgrades in resistive activities  Progress note measurements taken this session in which pt  Presents with increased ROM with decreased pain, however still limited with strength RUE  Session focused on HEP education, range of motion, patient education, and strengthening to improve functional task performance with daily activities  Patient tolerated all TE/TA with no complaints able to tolerate upgrades in resistance  Patient progressing well towards goals  Patient benefiting from skilled hand therapy OT to reduce deficits to improve independence with daily activities  Plan:   Focus on AROM and strengthening to improve ability to complete daily activites with ease   POC: 4/13/2023 - 06/22/2023

## 2023-06-29 ENCOUNTER — OFFICE VISIT (OUTPATIENT)
Dept: OCCUPATIONAL THERAPY | Facility: CLINIC | Age: 73
End: 2023-06-29
Payer: COMMERCIAL

## 2023-06-29 DIAGNOSIS — S62.101D CLOSED FRACTURE OF RIGHT WRIST WITH ROUTINE HEALING, SUBSEQUENT ENCOUNTER: Primary | ICD-10-CM

## 2023-06-29 PROCEDURE — 97110 THERAPEUTIC EXERCISES: CPT

## 2023-06-29 PROCEDURE — 97530 THERAPEUTIC ACTIVITIES: CPT

## 2023-06-29 NOTE — PROGRESS NOTES
"Daily Note     Today's date: 2023  Patient name: Genny Nguyen  : 1950  MRN: [id number]  Referring provider: Jeffrey Godfrey PA-C  Dx:   Encounter Diagnosis     ICD-10-CM    1  Closed fracture of right wrist with routine healing, subsequent encounter  S62 101D                      Subjective  Pt  Reports, \"I've noticed this week it's been a lot with my  my hand strength isn't as good it gets weak soon\"  Assessment  Patient is a 71 y o  RHD female who presents for OT RE Right distal radius and ulna fractures status post reduction, 2022, conservative treatment  Patient tolerated session well with upgrades in resistive activities  Progress note measurements taken this session in which pt  Presents with increased ROM with decreased pain, however still limited with strength in RUE  Session focused on range of motion, patient education in proper body mechanics in daily activities as well as strengthening to improve functional task performance with daily activities  Patient tolerated all TE/TA with no complaints able to tolerate upgrades in resistance  Patient progressing well towards goals  Patient benefiting from skilled hand therapy OT to reduce deficits to improve independence with daily activities  Visit Tracker: Kianna Shah   COPAY $15  Date   IE 4/20 4/27 5/4 5/18 5/25    6/1 6/8 6/15 6/22  RE                                                                        Manuals HEP 2023                        Ther Ex     Education on Dx and HEP x x5min   Wrist flex & ext stretch x x5 on red powerweb   Hammer pro/sup x x10 2lbs   Wrist PREs x 3 lbs ways 10x   Therapist assist STM and PROM  x10 min    Prayer stretch x                   Ther Activity     juxtaciser     Wrist rotator  x5   theraputty rolling and pushing with cone x    Flexbar N's/U's  x10 Green   Sponges Grasp and Release with lvl 2 gripper   1 cycle of all sponges   PowerWeb squeeze and rotate   R " 10x                       Modalities      x x5min            Assessment:   Patient tolerated session well with upgrades in resistive activities  Progress note measurements taken this session in which pt  Presents with increased ROM with decreased pain, however still limited with strength RUE  Session focused on HEP education, range of motion, patient education, and strengthening to improve functional task performance with daily activities  Patient tolerated all TE/TA with no complaints able to tolerate upgrades in resistance  Patient progressing well towards goals and was given G theraband to provide increased resistance in HEP  Patient benefiting from skilled hand therapy OT to reduce deficits to improve independence with daily activities  Plan:   Focus on AROM and strengthening to improve ability to complete daily activites with ease   POC: 6/22/2023 - 07/22/2023

## 2023-07-06 ENCOUNTER — OFFICE VISIT (OUTPATIENT)
Dept: OCCUPATIONAL THERAPY | Facility: CLINIC | Age: 73
End: 2023-07-06
Payer: COMMERCIAL

## 2023-07-06 DIAGNOSIS — S62.101D CLOSED FRACTURE OF RIGHT WRIST WITH ROUTINE HEALING, SUBSEQUENT ENCOUNTER: Primary | ICD-10-CM

## 2023-07-06 PROCEDURE — 97530 THERAPEUTIC ACTIVITIES: CPT

## 2023-07-06 PROCEDURE — 97110 THERAPEUTIC EXERCISES: CPT

## 2023-07-06 NOTE — PROGRESS NOTES
Daily Note     Today's date: 2023  Patient name: Brigitte Callejas  : 1950  MRN: [id number]  Referring provider: Jose R Salas PA-C  Dx:   Encounter Diagnosis     ICD-10-CM    1. Closed fracture of right wrist with routine healing, subsequent encounter  S62.101D                      Subjective  Pt. Reports, "My wrist and fingers are doing much better right now, minimal pain if anything". Assessment  Patient is a 71 y.o. RHD female who presents for OT RE Right distal radius and ulna fractures status post reduction, 2022, conservative treatment. Visit Tracker: Nirmal Becker. COPAY $15  Date   IE 4/20 4/27 5/4 5/18 5/25    6/1 6/8 6/15 6/22  RE                                                                       Manuals HEP 2023                        Ther Ex     Education on Dx and HEP x x5min   Wrist flex & ext stretch x x5 for 5 sec on red powerweb   Hammer pro/sup x x10 2lbs   Wrist PREs x 3 lbs ways 10x   Therapist assist STM and PROM  x10 min    Prayer stretch x                   Ther Activity     juxtaciser     Wrist rotator     theraputty puttycisers x x10 Y putty   Flexbar N's/U's  x10 Green   Sponges Grasp and Release with lvl 2 gripper   1 cycle of all sponges   PowerWeb squeeze and rotate   R 10x   Finding pegs in putty  x10 pegs Y putty                  Modalities     MH x x5min            Assessment:   Patient tolerated session well with upgrades in resistive activities. Presents with increased ROM with decreased pain, however still limited with strength RUE. Session focused on HEP education, range of motion, patient education, and strengthening to improve functional task performance with daily activities. Patient tolerated all TE/TA with no complaints able to tolerate upgrades in resistance. Patient progressing well towards goals and was given G theraband to provide increased resistance in HEP.  Patient benefiting from skilled hand therapy OT to reduce deficits to improve independence with daily activities. Plan:   Focus on AROM and strengthening to improve ability to complete daily activites with ease.  POC: 6/22/2023 - 07/22/2023

## 2023-07-13 ENCOUNTER — OFFICE VISIT (OUTPATIENT)
Dept: OCCUPATIONAL THERAPY | Facility: CLINIC | Age: 73
End: 2023-07-13
Payer: COMMERCIAL

## 2023-07-13 DIAGNOSIS — S62.101D CLOSED FRACTURE OF RIGHT WRIST WITH ROUTINE HEALING, SUBSEQUENT ENCOUNTER: Primary | ICD-10-CM

## 2023-07-13 PROCEDURE — 97110 THERAPEUTIC EXERCISES: CPT

## 2023-07-13 PROCEDURE — 97530 THERAPEUTIC ACTIVITIES: CPT

## 2023-07-13 NOTE — PROGRESS NOTES
Daily Note     Today's date: 2023  Patient name: Dangelo Cantu  : 1950  MRN: [id number]  Referring provider: Corona Espinosa PA-C  Dx:   Encounter Diagnosis     ICD-10-CM    1. Closed fracture of right wrist with routine healing, subsequent encounter  S62.101D                      Subjective  Pt. Reports, "My wrist is sore today, my  had a dr. Visit yesterday I had to  and down the wheelchair which was a lot". Assessment  Patient is a 71 y.o. RHD female who presents for OT RE Right distal radius and ulna fractures status post reduction, 2022, conservative treatment. Visit Tracker: Susanne Newman. COPAY $15  Date   IE 4/20 4/27 5/4 5/18 5/25    6/1 6/8 6/15 6/22  RE                                                                   Manuals HEP 2023                        Ther Ex     Education on Dx and HEP x x5min   Wrist flex & ext stretch x x5 for 5 sec on red powerweb   Hammer pro/sup x x10 2lbs   Wrist PREs x 4 lbs ways 10x   Therapist assist STM and PROM  x10 min    Prayer stretch x                   Ther Activity     juxtaciser     Wrist rotator     theraputty puttycisers x x10 Y putty   Flexbar N's/U's  x10 Green   Sponges Grasp and Release with lvl 3 gripper   1 cycle of all sponges   PowerWeb squeeze and rotate   R 10x   Finding pegs in putty  x10 pegs Y putty                  Modalities     MH x x5min            Assessment:   Patient tolerated session well with upgrades in resistive activities with minimal reports of pain. Presents with increased ROM with decreased pain, however still limited with strength RUE. Patient benefiting from skilled hand therapy OT to reduce deficits to improve independence with daily activities. Plan:   Focus on AROM and strengthening to improve ability to complete daily activites with ease.  POC: 2023 - 2023

## 2023-07-20 ENCOUNTER — OFFICE VISIT (OUTPATIENT)
Dept: OCCUPATIONAL THERAPY | Facility: CLINIC | Age: 73
End: 2023-07-20
Payer: COMMERCIAL

## 2023-07-20 DIAGNOSIS — S62.101D CLOSED FRACTURE OF RIGHT WRIST WITH ROUTINE HEALING, SUBSEQUENT ENCOUNTER: Primary | ICD-10-CM

## 2023-07-20 PROCEDURE — 97530 THERAPEUTIC ACTIVITIES: CPT

## 2023-07-20 PROCEDURE — 97110 THERAPEUTIC EXERCISES: CPT

## 2023-07-20 NOTE — PROGRESS NOTES
Daily Note     Today's date: 2023  Patient name: Wicho Lee  : 1950  MRN: [id number]  Referring provider: Harmony Cheney PA-C  Dx:   Encounter Diagnosis     ICD-10-CM    1. Closed fracture of right wrist with routine healing, subsequent encounter  S62.101D                    Subjective  Pt. Reports, "My wrist is sore but I have been overusing it so it could because of that". Assessment  Patient is a 71 y.o. RHD female who presents for OT RE Right distal radius and ulna fractures status post reduction, 2022, conservative treatment. Visit Tracker: Cecelia Martinez. COPAY $15  Date   IE 4/20 4/27 5/4 5/18 5/25    6/1 6/8 6/15 6/22  RE                                                                  Manuals HEP 2023                        Ther Ex     Education on Dx and HEP x x5   Wrist flex & ext stretch x x5 for 5 sec on red powerweb   Hammer pro/sup x x10 2lbs   Wrist PREs x 4 lbs ways 10x   Therapist assist STM and PROM  x10 min    Prayer stretch x                   Ther Activity     juxtaciser     Wrist rotator     Theraputty Pressing with cone  x x10 Red putty   Flexbar N's/U's  x10 Green   Sponges Grasp and Release with lvl 2 gripper   x2 whole bin    PowerWeb squeeze and rotate   R 10x   Finding pegs in putty     Stacking cone with rotation  10 cones              Modalities     MHP x x5min          Assessment:   Patient tolerated session well with upgrades in resistive activities with minimal reports of pain. Presents with increased ROM with decreased pain, however still limited with strength RUE. Patient benefiting from skilled hand therapy OT to reduce deficits to improve independence with daily activities. Plan:   Focus on AROM and strengthening to improve ability to complete daily activites with ease.  POC: 2023 - 2023

## 2023-07-27 ENCOUNTER — OFFICE VISIT (OUTPATIENT)
Dept: OCCUPATIONAL THERAPY | Facility: CLINIC | Age: 73
End: 2023-07-27
Payer: COMMERCIAL

## 2023-07-27 DIAGNOSIS — S62.101D CLOSED FRACTURE OF RIGHT WRIST WITH ROUTINE HEALING, SUBSEQUENT ENCOUNTER: Primary | ICD-10-CM

## 2023-07-27 PROCEDURE — 97110 THERAPEUTIC EXERCISES: CPT

## 2023-07-27 PROCEDURE — 97530 THERAPEUTIC ACTIVITIES: CPT

## 2023-07-27 NOTE — PROGRESS NOTES
Daily Note     Today's date: 2023  Patient name: Lindsey Houston  : 1950  MRN: [id number]  Referring provider: Hellen Etienne PA-C  Dx:   Encounter Diagnosis     ICD-10-CM    1. Closed fracture of right wrist with routine healing, subsequent encounter  S62.101D                    Subjective  Pt. Reports, "My wrist is doing better I have been trying to push it more, turning palm up is hard". Assessment  Patient is a 71 y.o. RHD female who presents for OT RE Right distal radius and ulna fractures status post reduction, 2022, conservative treatment. Visit Tracker: Cyndi Triplett. COPAY $15  Date   IE 4/20 4/27 5/4 5/18 5/25    6/1 6/8 6/15 6/22  RE                                                                 Manuals HEP 2023                        Ther Ex     Education on Dx and HEP x x5   Wrist flex & ext stretch x x5 for 5 sec on red powerweb   Hammer pro/sup x x10 2lbs   Wrist PREs x 4 lbs ways 10x   Therapist assist STM and PROM  x10 min    Prayer stretch x                   Ther Activity     juxtaciser  x5   Wrist rotator     Theraputty Pressing with cone  x x10 Red putty   Flexbar N's/U's  x10 Green   Sponges Grasp and Release with lvl 2 gripper   x1 whole bin    Sponges grasp and release with wrist circles  x1 whole bin   Finding pegs in putty     Stacking cone with rotation  10 cones x2             Modalities     MHP x x5min          Assessment:   Patient tolerated session well with upgrades in resistive activities with minimal reports of pain. Presents with increased ROM with decreased pain, however still limited with supination ROM. Patient benefiting from skilled hand therapy OT to reduce deficits to improve independence with daily activities. Plan:   Focus on AROM and strengthening to improve ability to complete daily activites with ease.  POC: 2023 - 2023

## 2023-08-03 ENCOUNTER — OFFICE VISIT (OUTPATIENT)
Dept: OCCUPATIONAL THERAPY | Facility: CLINIC | Age: 73
End: 2023-08-03
Payer: COMMERCIAL

## 2023-08-03 DIAGNOSIS — S62.101D CLOSED FRACTURE OF RIGHT WRIST WITH ROUTINE HEALING, SUBSEQUENT ENCOUNTER: Primary | ICD-10-CM

## 2023-08-03 PROCEDURE — 97110 THERAPEUTIC EXERCISES: CPT

## 2023-08-03 PROCEDURE — 97530 THERAPEUTIC ACTIVITIES: CPT

## 2023-08-03 NOTE — PROGRESS NOTES
Daily Note     Today's date: 8/3/2023  Patient name: Aston Chris  : 1950  MRN: [id number]  Referring provider: Genie Eaton PA-C  Dx:   Encounter Diagnosis     ICD-10-CM    1. Closed fracture of right wrist with routine healing, subsequent encounter  S62.101D                    Subjective  Pt. Reports, "My wrist is doing better I have been trying to push it more, turning palm up is hard". Assessment  Patient is a 71 y.o. RHD female who presents for OT RE Right distal radius and ulna fractures status post reduction, 2022, conservative treatment. Visit Tracker: Leslie Joanie. COPAY $15  Date   IE 4/20 4/27 5/4 5/18 5/25    6/1 6/8 6/15 6/22  RE 6/29 7/6    7/13 7/20 7/27 8/3       RE  X                                                        Manuals HEP 8/3/2023                        Ther Ex     Education on Dx and HEP x x5   Wrist flex & ext stretch x x5 for 5 sec on green powerweb   Hammer pro/sup x x10 2lbs   Wrist PREs x 4 lbs ways 10x   Therapist assist STM and PROM  x10 min    Prayer stretch x                   Ther Activity     juxtaciser  x5   Wrist rotator     Theraputty Pressing with dummbbell x x10 Red putty   Flexbar N's/U's  x10 Green   Sponges Grasp and Release with lvl 2 gripper   x1 whole bin    Sponges grasp and release with wrist circles  x1 whole bin   Finding pegs in putty     Stacking cone with rotation  10 cones x2             Modalities     MHP x x5min          Assessment:   Patient tolerated session well with upgrades in resistive activities with minimal reports of pain. Presents with increased ROM with decreased pain, however still limited with supination ROM. Patient benefiting from skilled hand therapy OT to reduce deficits to improve independence with daily activities. Plan:   Focus on AROM and strengthening to improve ability to complete daily activites with ease.  POC: 2023 - 10/03/2023

## 2023-08-17 ENCOUNTER — OFFICE VISIT (OUTPATIENT)
Dept: OCCUPATIONAL THERAPY | Facility: CLINIC | Age: 73
End: 2023-08-17
Payer: COMMERCIAL

## 2023-08-17 DIAGNOSIS — S62.101D CLOSED FRACTURE OF RIGHT WRIST WITH ROUTINE HEALING, SUBSEQUENT ENCOUNTER: Primary | ICD-10-CM

## 2023-08-17 PROCEDURE — 97530 THERAPEUTIC ACTIVITIES: CPT

## 2023-08-17 PROCEDURE — 97110 THERAPEUTIC EXERCISES: CPT

## 2023-08-17 NOTE — PROGRESS NOTES
Daily Note     Today's date: 2023  Patient name: Ruthie Curry  : 1950  MRN: [id number]  Referring provider: Lis Hernandez PA-C  Dx:   Encounter Diagnosis     ICD-10-CM    1. Closed fracture of right wrist with routine healing, subsequent encounter  S62.101D         Start Time: 1100  Stop Time: 1145  Total time in clinic (min): 45 minutes    Subjective  Pt. Reports, "My wrist is doing better, I feel the strength coming back". Assessment  Patient is a 71 y.o. RHD female who presents for OT RE Right distal radius and ulna fractures status post reduction, 2022, conservative treatment. Visit Tracker: Ruben Vasquez. COPAY $15  Date   IE 4/20 4/27 5/4 5/18 5/25    6/1 6/8 6/15 6/22  RE 6/29 7/6    7/13 7/20 7/27 8/3 8/17      RE  X                                                        Manuals HEP 2023                        Ther Ex     Education on Dx and HEP x x5   Wrist flex & ext stretch x x5 for 5 sec on green powerweb   Hammer pro/sup x x10 2 wts   Wrist PREs x    Therapist assist STM and PROM  x10 min    Prayer stretch x 5x 5 sec   Bicep Curls   3 ways 10x 4#             Ther Activity     juxtaciser  x5   Wrist rotator     Theraputty Pressing with dummbbell x x10 Red putty   Flexbar N's/U's  x10 Green   Sponges Grasp and Release with lvl 2 gripper   x1 whole bin    Sponges grasp and release with wrist circles  x1 whole bin   Stacking cone with rotation  10 cones x2   Putty Presses with dumbbells   whole R Putty bin         Modalities     MHP x x5min          Assessment:   Patient tolerated session well with upgrades in resistive activities with minimal reports of pain. Presents with increased ROM with decreased pain. Patient benefiting from skilled hand therapy OT to reduce deficits to improve independence with daily activities. Plan:   Focus on AROM and strengthening to improve ability to complete daily activites with ease.  POC: 2023 - 10/03/2023

## 2023-08-24 ENCOUNTER — OFFICE VISIT (OUTPATIENT)
Dept: OCCUPATIONAL THERAPY | Facility: CLINIC | Age: 73
End: 2023-08-24
Payer: COMMERCIAL

## 2023-08-24 DIAGNOSIS — S62.101D CLOSED FRACTURE OF RIGHT WRIST WITH ROUTINE HEALING, SUBSEQUENT ENCOUNTER: Primary | ICD-10-CM

## 2023-08-24 PROCEDURE — 97140 MANUAL THERAPY 1/> REGIONS: CPT

## 2023-08-24 PROCEDURE — 97110 THERAPEUTIC EXERCISES: CPT

## 2023-08-24 PROCEDURE — 97530 THERAPEUTIC ACTIVITIES: CPT

## 2023-08-24 NOTE — PROGRESS NOTES
Daily Note     Today's date: 2023  Patient name: Samantha Lilly  : 1950  MRN: [id number]  Referring provider: Carly Feliciano PA-C  Dx:   Encounter Diagnosis     ICD-10-CM    1. Closed fracture of right wrist with routine healing, subsequent encounter  S62.101D                    Subjective  Pt. Reports, "My wrist is doing better, I feel the strength coming back". Assessment  Patient is a 71 y.o. RHD female who presents for OT RE Right distal radius and ulna fractures status post reduction, 2022, conservative treatment. Visit Tracker: Kimberly Warner. COPAY $15  Date   IE 4/20 4/27 5/4 5/18 5/25    6/1 6/8 6/15 6/22  RE 6/29 7/6    7/13 7/20 7/27 8/3 8/17 8/24     RE  X                                                        Manuals HEP 2023    STM/PROM  x10 min                  Ther Ex     Education on Dx and HEP x    Wrist flex & ext stretch x x5 for 5 sec on red web    Hammer pro/sup x x10 3 wts   Prayer stretch x 5x 5 sec   Bicep Curls   3 ways 10x 4#   Wrist PRE's  3 ways 10x 3#        Ther Activity     juxtaciser     Wrist rotator     Flexbar N's/U's  x10 Green   Sponges Grasp and Release with lvl 3 gripper   x1 whole bin    Stacking cone with rotation  10 cones x2   Putty Presses with dumbbells   whole R Putty bin                             Modalities     MHP x x5min          Assessment:   Patient tolerated session well with upgrades in resistive activities with minimal reports of pain with increased activity tolerance this session. Presents with increased ROM with decreased discomfort. Patient benefiting from skilled hand therapy OT to reduce deficits to improve independence with daily activities. Plan:   Focus on AROM and strengthening to improve ability to complete daily activites with ease.  POC: 2023 - 10/03/2023

## 2023-08-31 ENCOUNTER — OFFICE VISIT (OUTPATIENT)
Dept: OCCUPATIONAL THERAPY | Facility: CLINIC | Age: 73
End: 2023-08-31
Payer: COMMERCIAL

## 2023-08-31 DIAGNOSIS — S62.101D CLOSED FRACTURE OF RIGHT WRIST WITH ROUTINE HEALING, SUBSEQUENT ENCOUNTER: Primary | ICD-10-CM

## 2023-08-31 PROCEDURE — 97110 THERAPEUTIC EXERCISES: CPT

## 2023-08-31 PROCEDURE — 97530 THERAPEUTIC ACTIVITIES: CPT

## 2023-08-31 NOTE — PROGRESS NOTES
Daily Note     Today's date: 2023  Patient name: Reinaldo York  : 1950  MRN: [id number]  Referring provider: Michelle Martinez PA-C  Dx:   Encounter Diagnosis     ICD-10-CM    1. Closed fracture of right wrist with routine healing, subsequent encounter  S62.101D                    Subjective  Pt. Reports, "My wrist is doing better, I feel the strength coming back". Assessment  Patient is a 71 y.o. RHD female who presents for OT RE Right distal radius and ulna fractures status post reduction, 2022, conservative treatment. Visit Tracker: Daina Junior. COPAY $15  Date   IE 4/20 4/27 5/4 5/18 5/25    6/1 6/8 6/15 6/22  RE 6/29 7/6    7/13 7/20 7/27 8/3 8/17 8/24    8/31 RE  X                                                        Manuals HEP 2023    STM/PROM  x10 min                  Ther Ex     Education on Dx and HEP x    Wrist flex & ext stretch x x5 for 5 sec on red web    Hammer pro/sup x x10 3 wts   Prayer stretch x 5x 5 sec   Bicep Curls   3 ways 10x 4#   Wrist PRE's  3 ways 10x 3#        Ther Activity     juxtaciser     Wrist rotator  x10   Flexbar N's/U's  x10 Green   Sponges Grasp and Release with lvl 3 gripper   x1 whole bin    Stacking cone with rotation  10 cones x2 with 1# cuff   Putty Presses with dumbbells                               Modalities     MHP x x5min          Assessment:   Patient tolerated session well with resistive activities with no reports of pain with increased activity tolerance this session. Patient benefiting from skilled hand therapy OT to reduce deficits to improve independence with daily activities. Plan:   Focus on AROM and strengthening to improve ability to complete daily activites with ease.  POC: 2023 - 10/03/2023

## 2023-09-14 ENCOUNTER — OFFICE VISIT (OUTPATIENT)
Dept: OCCUPATIONAL THERAPY | Facility: CLINIC | Age: 73
End: 2023-09-14
Payer: COMMERCIAL

## 2023-09-14 DIAGNOSIS — S62.101D CLOSED FRACTURE OF RIGHT WRIST WITH ROUTINE HEALING, SUBSEQUENT ENCOUNTER: Primary | ICD-10-CM

## 2023-09-14 PROCEDURE — 97530 THERAPEUTIC ACTIVITIES: CPT

## 2023-09-14 PROCEDURE — 97110 THERAPEUTIC EXERCISES: CPT

## 2023-09-14 PROCEDURE — 97140 MANUAL THERAPY 1/> REGIONS: CPT

## 2023-09-14 NOTE — PROGRESS NOTES
Daily Note     Today's date: 2023  Patient name: Pelon Lewis  : 1950  MRN: [id number]  Referring provider: Jordon Rush PA-C  Dx:   Encounter Diagnosis     ICD-10-CM    1. Closed fracture of right wrist with routine healing, subsequent encounter  S62.101D         Start Time: 845  Stop Time: 930  Total time in clinic (min): 45 minutes    Subjective  Pt. Reports, "My wrist is doing better but feeling stiff every morning". Assessment  Patient is a 71 y.o. RHD female who presents for OT RE Right distal radius and ulna fractures status post reduction, 2022, conservative treatment. Objective Measurements  Left Wrist   Wrist flexion: 72 degrees   Wrist extension: 80 degrees   Radial deviation: 20 degrees   Ulnar deviation: 34 degrees     Right Wrist   Wrist flexion: 62 degrees   Wrist extension: 58 degrees   Radial deviation: 20 degrees   Ulnar deviation: 28 degrees     Strength/Myotome Testing     Left Wrist/Hand      (2nd hand position)     Trial 1: 50    Thumb Strength  Key/Lateral Pinch     Trial 1: 10    Right Wrist/Hand      (2nd hand position)     Trial 1: 40    Thumb Strength   Key/Lateral Pinch     Trial 1: 12           Visit Tracker: NO AUTH Freddi Seip.  COPAY $15  Date   IE     6 6/8 6/15 6/22  RE  8/3 8/17 8/24    8/31 9/14  RE                                                        Manuals HEP 2023    STM/PROM  x10 min                  Ther Ex     Education on Dx and HEP x    Wrist flex & ext stretch x x5 for 5 sec on green web    Hammer pro/sup x x10 3 wts   Prayer stretch x    Bicep Curls   3 ways 10x 4# (not done this session)   Wrist PRE's  3 ways 10x 3# (not done this session)   Progress Measurements Taken   x8 min                  Ther Activity     juxtaciser     Wrist rotator  x10   Flexbar N's/U's  x10 Green   Sponges Grasp and Release with lvl 3 gripper   x1 whole bin    Stacking cone with rotation  10 cones x2 with 1# cuff   Putty Presses with dumbbells   Not done this session   Dice grasp and release with G clothespin  Whole jar   Putty finding pegs  8 pegs in R putty                  Modalities     MHP x x5min          Assessment:   Progress note measurements taken during session in which patient presents with increased  strength by 5 lbs and able to perform ADLs with improved ability and decreased discomfort. Patient is progressing well towards long term goals. Patient tolerated session well with resistive activities with no reports of pain with increased activity tolerance this session. STM and PROM manual therapy performed in which patient able to reach increased wrist ROM but with no notable pain or discomfort. Patient benefiting from skilled hand therapy OT to reduce deficits to improve independence with daily activities. Plan:   Focus on AROM and strengthening to improve ability to complete daily activites with ease.  POC: 08/03/2023 - 10/03/2023

## 2023-09-21 ENCOUNTER — OFFICE VISIT (OUTPATIENT)
Dept: OCCUPATIONAL THERAPY | Facility: CLINIC | Age: 73
End: 2023-09-21
Payer: COMMERCIAL

## 2023-09-21 DIAGNOSIS — S62.101D CLOSED FRACTURE OF RIGHT WRIST WITH ROUTINE HEALING, SUBSEQUENT ENCOUNTER: Primary | ICD-10-CM

## 2023-09-21 PROCEDURE — 97530 THERAPEUTIC ACTIVITIES: CPT

## 2023-09-21 PROCEDURE — 97140 MANUAL THERAPY 1/> REGIONS: CPT

## 2023-09-21 PROCEDURE — 97110 THERAPEUTIC EXERCISES: CPT

## 2023-09-21 NOTE — PROGRESS NOTES
Daily Note     Today's date: 2023  Patient name: Reinaldo York  : 1950  MRN: [id number]  Referring provider: Michelle Martinez PA-C  Dx:   Encounter Diagnosis     ICD-10-CM    1. Closed fracture of right wrist with routine healing, subsequent encounter  S62.101D                    Subjective  Pt. Reports no soreness or pain in R wrist.    Assessment  Patient is a 71 y.o. RHD female who presents for OT RE Right distal radius and ulna fractures status post reduction, 2022, conservative treatment. Objective See Below       Visit Tracker: Daina Junior. COPAY $15  Date   IE 4/20 4/27 5/4 5/18 5/25    6/1 6/8 6/15 6/22  RE  8/3 8/17 8/24    8/31 9/14  RE                                                        Manuals HEP 2023    STM/PROM  x10 min                  Ther Ex     Education on Dx and HEP x    Wrist flex & ext stretch x x5 for 5 sec on green web    Hammer pro/sup x x10 3 wts   Prayer stretch x 5 x 10 seconds   Bicep Curls   3 ways 10x 4#    Wrist PRE's  3 ways 10x 3#    Sup/Pro with Green theraband  x10                   Ther Activity     juxtaciser     Wrist rotator  x10   Flexbar N's/U's  x10 Green   lvl 3 gripper sustaining grasp with towel   40 seconds x2   Stacking cone with rotation  10 cones x2 with 1# cuff   Putty Presses with dumbbells   Not done this session   PowerWeb Green  Flexor stretch 5 x 10 seconds             Modalities     MHP x x5min          Assessment:   Patient tolerated session well with resistive activities with no reports of pain with increased activity tolerance this session. STM and PROM manual therapy performed in which patient able to reach increased wrist ROM but with no notable pain or discomfort. Patient benefiting from skilled hand therapy OT to reduce deficits to improve independence with daily activities. Plan:   Focus on AROM and strengthening to improve ability to complete daily activites with ease.  POC: 08/03/2023 - 10/03/2023

## 2023-09-28 ENCOUNTER — OFFICE VISIT (OUTPATIENT)
Dept: OCCUPATIONAL THERAPY | Facility: CLINIC | Age: 73
End: 2023-09-28
Payer: COMMERCIAL

## 2023-09-28 DIAGNOSIS — S62.101D CLOSED FRACTURE OF RIGHT WRIST WITH ROUTINE HEALING, SUBSEQUENT ENCOUNTER: Primary | ICD-10-CM

## 2023-09-28 PROCEDURE — 97530 THERAPEUTIC ACTIVITIES: CPT

## 2023-09-28 PROCEDURE — 97110 THERAPEUTIC EXERCISES: CPT

## 2023-09-28 PROCEDURE — 97140 MANUAL THERAPY 1/> REGIONS: CPT

## 2023-09-28 NOTE — PROGRESS NOTES
Daily Note     Today's date: 2023  Patient name: Suri Wellington  : 1950  MRN: [id number]  Referring provider: Gabe Silveira PA-C  Dx:   Encounter Diagnosis     ICD-10-CM    1. Closed fracture of right wrist with routine healing, subsequent encounter  S62.101D                    Subjective  Pt. Reports no soreness or pain in R wrist.    Assessment  Patient is a 71 y.o. RHD female who presents for OT RE Right distal radius and ulna fractures status post reduction, 2022, conservative treatment. Objective See Below    Objective Measurements  Left Wrist   Wrist flexion: 75 degrees   Wrist extension: 77 degrees   Radial deviation: 19 degrees   Ulnar deviation: 30 degrees     Right Wrist   Wrist flexion: 66 degrees   Wrist extension: 58 degrees   Radial deviation: 20 degrees   Ulnar deviation: 28 degrees     Strength/Myotome Testing     Left Wrist/Hand    (2nd hand position)     Trial 1: 55    Right Wrist/Hand    (2nd hand position)     Trial 1: 45         Visit Tracker: Shasta Del Toro.  COPAY $15  Date   IE     6 6/8 6/15 6/22  RE  8/3 8/17 8/24    8/31 9/14  RE   DC                                                      Manuals HEP 2023    STM/PROM  x10 min                  Ther Ex     Education on Dx and HEP x    Wrist flex & ext stretch x x5 for 5 sec on green web    Hammer pro/sup x x10 3 wts   Prayer stretch x 5 x 10 seconds   Bicep Curls      Wrist PRE's  3 ways 10x 3#    Sup/Pro with Blue theraband                    Ther Activity     juxtaciser     Wrist rotator     Flexbar N's/U's  x10 Green   lvl 3 gripper sustaining grasp with towel   40 seconds x2   Stacking cone with rotation  10 cones x2 with 1# cuff   Putty Presses with dumbbells   Not done this session   PowerWeb Green  Flexor stretch 5 x 10 seconds             Modalities     MHP x x5min          Assessment:   Patient tolerated session good with upgrades in resistive activities with no reports of pain with increased activity tolerance this session. STM and PROM manual therapy performed in which patient able to reach increased wrist ROM but with no notable pain or discomfort. Patient presents with no difficulty in performing all activities of daily living and meal preparation tasks, hence patient will be discharged today with an updated HEP. Patient performed all HEP exercises with good form and return demo. Plan:   D/c to updated HEP at this time.

## 2023-11-22 ENCOUNTER — OFFICE VISIT (OUTPATIENT)
Dept: OBGYN CLINIC | Facility: HOSPITAL | Age: 73
End: 2023-11-22
Payer: COMMERCIAL

## 2023-11-22 VITALS
SYSTOLIC BLOOD PRESSURE: 130 MMHG | HEART RATE: 78 BPM | WEIGHT: 173 LBS | OXYGEN SATURATION: 98 % | HEIGHT: 61 IN | BODY MASS INDEX: 32.66 KG/M2 | DIASTOLIC BLOOD PRESSURE: 86 MMHG

## 2023-11-22 DIAGNOSIS — S62.101D CLOSED FRACTURE OF RIGHT WRIST WITH ROUTINE HEALING, SUBSEQUENT ENCOUNTER: Primary | ICD-10-CM

## 2023-11-22 PROCEDURE — 99213 OFFICE O/P EST LOW 20 MIN: CPT | Performed by: ORTHOPAEDIC SURGERY

## 2023-11-22 NOTE — PROGRESS NOTES
ASSESSMENT/PLAN:    Assessment:   Right distal radius and ulna fractures status post reduction, DOI 9/14/2022    Plan:   There is continued loss of supination. She may continue hand therapy for emphasis on supination ROM. She may return to activities as tolerated. Follow Up:  PRN    _____________________________________________________  CHIEF COMPLAINT:  Chief Complaint   Patient presents with    Right Wrist - Follow-up     Right distal radius and ulna fracture status post reduction, DOI 9/14/2022  ROM and strengthening         SUBJECTIVE:  Phani Barrera is a 68 y.o. female who presents for follow up regarding right distal radius and ulna fractures status post reduction, DOI 9/14/2022. Since last visit she has been attending hand therapy. She has been working on her range of motion. She is not experiencing any pain. She continues to make progress. PAST MEDICAL HISTORY:  Past Medical History:   Diagnosis Date    Arthritis        PAST SURGICAL HISTORY:  Past Surgical History:   Procedure Laterality Date    HYSTERECTOMY         FAMILY HISTORY:  History reviewed. No pertinent family history. SOCIAL HISTORY:  Social History     Tobacco Use    Smoking status: Never    Smokeless tobacco: Never   Vaping Use    Vaping Use: Never used   Substance Use Topics    Alcohol use:  Yes     Alcohol/week: 2.0 standard drinks of alcohol     Types: 2 Glasses of wine per week    Drug use: Never       MEDICATIONS:    Current Outpatient Medications:     albuterol (PROVENTIL HFA,VENTOLIN HFA) 90 mcg/act inhaler, Inhale 1-2 puffs, Disp: , Rfl:     ascorbic acid (VITAMIN C) 1000 MG tablet, Take 1,000 mg by mouth daily, Disp: , Rfl:     chlorhexidine (PERIDEX) 0.12 % solution, RINSE WITH 1/2 OZ TWO TIMES A DAY AFTER BREAKFAST AND BEFORE BEDTIME AFTER BRUSHING AND FLOSSING, Disp: , Rfl:     Cholecalciferol 50 MCG (2000 UT) CAPS, Take 1 capsule by mouth daily, Disp: , Rfl:     halobetasol (ULTRAVATE) 0.05 % cream, Apply 1 application topically 2 (two) times a day, Disp: , Rfl:     levocetirizine (XYZAL) 5 MG tablet, , Disp: , Rfl:     levothyroxine 50 mcg tablet, Take 50 mcg by mouth daily, Disp: , Rfl:     MILK THISTLE PO, Take 1 tablet by mouth daily, Disp: , Rfl:     Omega-3 Fatty Acids (fish oil) 1,000 mg, Take 1 capsule by mouth daily, Disp: , Rfl:     sertraline (ZOLOFT) 50 mg tablet, , Disp: , Rfl:     traZODone (DESYREL) 50 mg tablet, , Disp: , Rfl:     valsartan-hydrochlorothiazide (DIOVAN-HCT) 160-12.5 MG per tablet, , Disp: , Rfl:     Wixela Inhub 500-50 MCG/ACT inhaler, , Disp: , Rfl:     ALLERGIES:  No Known Allergies    REVIEW OF SYSTEMS:  Pertinent items are noted in HPI. A comprehensive review of systems was negative. LABS:  HgA1c: No results found for: "HGBA1C"  BMP: No results found for: "GLUCOSE", "CALCIUM", "NA", "K", "CO2", "CL", "BUN", "CREATININE"    _____________________________________________________  PHYSICAL EXAMINATION:  Vital signs: /86   Pulse 78   Ht 5' 1" (1.549 m)   Wt 78.5 kg (173 lb)   SpO2 98%   BMI 32.69 kg/m²   General: well developed and well nourished, alert, oriented times 3, and appears comfortable  Psychiatric: Normal  HEENT: Trachea Midline, No torticollis  Cardiovascular: No discernable arrhythmia  Pulmonary: No wheezing or stridor  Abdomen: No rebound or guarding  Extremities: No peripheral edema  Skin: No masses, erythema, lacerations, fluctation, ulcerations  Neurovascular: Sensation Intact to the Median, Ulnar, Radial Nerve, Motor Intact to the Median, Ulnar, Radial Nerve, and Pulses Intact    MUSCULOSKELETAL EXAMINATION:  Right wrist: 90 to 60 pronation to supination, extension missing 10 degrees, full flexion. No tenderness to palpation ECU, extensor tendons, DRUJ, or fracture sites.     _____________________________________________________  STUDIES REVIEWED:  No Studies to review      PROCEDURES PERFORMED:  Procedures  No Procedures performed serge Borjas      I,: Lio Jarquin am acting as a scribe while in the presence of the attending physician.:       I,:  Deysi Pastor MD personally performed the services described in this documentation    as scribed in my presence.:

## 2024-04-11 ENCOUNTER — APPOINTMENT (OUTPATIENT)
Dept: LAB | Facility: HOSPITAL | Age: 74
End: 2024-04-11
Payer: COMMERCIAL

## 2024-04-11 ENCOUNTER — HOSPITAL ENCOUNTER (OUTPATIENT)
Dept: RADIOLOGY | Facility: HOSPITAL | Age: 74
Discharge: HOME | End: 2024-04-11
Attending: INTERNAL MEDICINE
Payer: COMMERCIAL

## 2024-04-11 DIAGNOSIS — R05.1 ACUTE COUGH: ICD-10-CM

## 2024-04-11 LAB
ANION GAP SERPL CALCULATED.3IONS-SCNC: 4 MMOL/L (ref 4–13)
BUN SERPL-MCNC: 16 MG/DL (ref 5–25)
CALCIUM SERPL-MCNC: 9.1 MG/DL (ref 8.4–10.2)
CHLORIDE SERPL-SCNC: 106 MMOL/L (ref 96–108)
CO2 SERPL-SCNC: 31 MMOL/L (ref 21–32)
CREAT SERPL-MCNC: 0.72 MG/DL (ref 0.6–1.3)
GFR SERPL CREATININE-BSD FRML MDRD: 83 ML/MIN/1.73SQ M
GLUCOSE P FAST SERPL-MCNC: 88 MG/DL (ref 65–99)
POTASSIUM SERPL-SCNC: 4.2 MMOL/L (ref 3.5–5.3)
SODIUM SERPL-SCNC: 141 MMOL/L (ref 135–147)

## 2024-04-11 PROCEDURE — 36415 COLL VENOUS BLD VENIPUNCTURE: CPT

## 2024-04-11 PROCEDURE — 80048 BASIC METABOLIC PNL TOTAL CA: CPT

## 2024-04-11 PROCEDURE — 71260 CT THORAX DX C+: CPT

## 2024-04-11 RX ADMIN — IOHEXOL 85 ML: 350 INJECTION, SOLUTION INTRAVENOUS at 13:17

## 2024-07-19 ENCOUNTER — TELEPHONE (OUTPATIENT)
Age: 74
End: 2024-07-19

## 2024-07-19 NOTE — TELEPHONE ENCOUNTER
07/19/24  Screened by: Anika Gonzalez    Referring Provider recall, overdue    Pre- Screening: Yes    There is no height or weight on file to calculate BMI.  Has patient been referred for a routine screening Colonoscopy? yes  Is the patient between 45-75 years old? yes      Previous Colonoscopy yes   If yes:    Date: 2019    Facility:     Reason:     Does the patient want to see a Gastroenterologist prior to their procedure OR are they having any GI symptoms? no    Has the patient been hospitalized or had abdominal surgery in the past 6 months? no    Does the patient use supplemental oxygen? no    Does the patient take Coumadin, Lovenox, Plavix, Elliquis, Xarelto, or other blood thinning medication? no    Has the patient had a stroke, cardiac event, or stent placed in the past year? no

## 2024-07-19 NOTE — TELEPHONE ENCOUNTER
Scheduled date of EGD/colonoscopy (as of today): 08/28/2024  Physician performing EGD/colonoscopy: Mr. Slater  Location of EGD/colonoscopy: [facility]  Desired bowel prep reviewed with patient: DELIO/DUL  Prep instructions sent via Outright  Instructions reviewed with patient by: MIKE  Clearances:  passed ASC

## 2024-08-14 ENCOUNTER — ANESTHESIA (OUTPATIENT)
Dept: ANESTHESIOLOGY | Facility: HOSPITAL | Age: 74
End: 2024-08-14

## 2024-08-14 ENCOUNTER — ANESTHESIA EVENT (OUTPATIENT)
Dept: ANESTHESIOLOGY | Facility: HOSPITAL | Age: 74
End: 2024-08-14

## 2024-08-23 ENCOUNTER — TELEPHONE (OUTPATIENT)
Dept: GASTROENTEROLOGY | Facility: CLINIC | Age: 74
End: 2024-08-23

## 2024-08-23 NOTE — TELEPHONE ENCOUNTER
Spoke to pt confirming pt's colonoscopy scheduled on 8/28/24 at [facility] with Dr Slater.  Informed [facility] would be calling the day prior with the arrival time.  Pt is going to review the instructions in my chart.  I asked her to please respond to the message if has any questions and will come directly to me.  Pt is aware she needs a  for the procedure.

## 2024-08-28 ENCOUNTER — HOSPITAL ENCOUNTER (OUTPATIENT)
Dept: GASTROENTEROLOGY | Facility: AMBULARY SURGERY CENTER | Age: 74
Setting detail: OUTPATIENT SURGERY
Discharge: HOME | End: 2024-08-28
Attending: INTERNAL MEDICINE
Payer: COMMERCIAL

## 2024-08-28 ENCOUNTER — ANESTHESIA EVENT (OUTPATIENT)
Dept: GASTROENTEROLOGY | Facility: AMBULARY SURGERY CENTER | Age: 74
End: 2024-08-28

## 2024-08-28 ENCOUNTER — ANESTHESIA (OUTPATIENT)
Dept: GASTROENTEROLOGY | Facility: AMBULARY SURGERY CENTER | Age: 74
End: 2024-08-28

## 2024-08-28 VITALS
HEART RATE: 66 BPM | DIASTOLIC BLOOD PRESSURE: 68 MMHG | SYSTOLIC BLOOD PRESSURE: 125 MMHG | TEMPERATURE: 97 F | OXYGEN SATURATION: 97 % | RESPIRATION RATE: 20 BRPM

## 2024-08-28 DIAGNOSIS — Z80.0 FAMILY HISTORY OF COLON CANCER: ICD-10-CM

## 2024-08-28 PROBLEM — I10 HTN (HYPERTENSION): Status: ACTIVE | Noted: 2024-08-28

## 2024-08-28 PROBLEM — J45.909 ASTHMA: Status: ACTIVE | Noted: 2024-08-28

## 2024-08-28 PROCEDURE — 88305 TISSUE EXAM BY PATHOLOGIST: CPT | Performed by: PATHOLOGY

## 2024-08-28 PROCEDURE — 45385 COLONOSCOPY W/LESION REMOVAL: CPT | Performed by: INTERNAL MEDICINE

## 2024-08-28 RX ORDER — ONDANSETRON 2 MG/ML
4 INJECTION INTRAMUSCULAR; INTRAVENOUS ONCE AS NEEDED
Status: CANCELLED | OUTPATIENT
Start: 2024-08-28

## 2024-08-28 RX ORDER — LIDOCAINE HYDROCHLORIDE 10 MG/ML
INJECTION, SOLUTION EPIDURAL; INFILTRATION; INTRACAUDAL; PERINEURAL AS NEEDED
Status: DISCONTINUED | OUTPATIENT
Start: 2024-08-28 | End: 2024-08-28

## 2024-08-28 RX ORDER — MELOXICAM 7.5 MG/1
7.5 TABLET ORAL DAILY
COMMUNITY

## 2024-08-28 RX ORDER — SODIUM CHLORIDE, SODIUM LACTATE, POTASSIUM CHLORIDE, CALCIUM CHLORIDE 600; 310; 30; 20 MG/100ML; MG/100ML; MG/100ML; MG/100ML
INJECTION, SOLUTION INTRAVENOUS CONTINUOUS PRN
Status: DISCONTINUED | OUTPATIENT
Start: 2024-08-28 | End: 2024-08-28

## 2024-08-28 RX ORDER — PROPOFOL 10 MG/ML
INJECTION, EMULSION INTRAVENOUS AS NEEDED
Status: DISCONTINUED | OUTPATIENT
Start: 2024-08-28 | End: 2024-08-28

## 2024-08-28 RX ADMIN — SODIUM CHLORIDE, SODIUM LACTATE, POTASSIUM CHLORIDE, AND CALCIUM CHLORIDE: .6; .31; .03; .02 INJECTION, SOLUTION INTRAVENOUS at 14:10

## 2024-08-28 RX ADMIN — PROPOFOL 50 MG: 10 INJECTION, EMULSION INTRAVENOUS at 14:16

## 2024-08-28 RX ADMIN — PROPOFOL 100 MG: 10 INJECTION, EMULSION INTRAVENOUS at 14:14

## 2024-08-28 RX ADMIN — PROPOFOL 50 MG: 10 INJECTION, EMULSION INTRAVENOUS at 14:19

## 2024-08-28 RX ADMIN — LIDOCAINE HYDROCHLORIDE 50 MG: 10 INJECTION, SOLUTION EPIDURAL; INFILTRATION; INTRACAUDAL; PERINEURAL at 14:14

## 2024-08-28 NOTE — ANESTHESIA POSTPROCEDURE EVALUATION
Post-Op Assessment Note    CV Status:  Stable  Pain Score: 0    Pain management: adequate       Mental Status:  Sleepy   Hydration Status:  Stable   PONV Controlled:  None     Post Op Vitals Reviewed: Yes    No anethesia notable event occurred.    Staff: Anesthesiologist, CRNA               BP   103/55   Temp      Pulse  64   Resp   14   SpO2   98

## 2024-08-28 NOTE — H&P
History and Physical -  [facility]  Lakeisha Akers 73 y.o. female MRN: [id number]                  HPI: Lakeisha Akers is a 73 y.o. year old female who presents for history of polyp      REVIEW OF SYSTEMS: Per the HPI, and otherwise unremarkable.    Historical Information   Past Medical History:   Diagnosis Date    Arthritis      Past Surgical History:   Procedure Laterality Date    HYSTERECTOMY       Social History   Social History     Substance and Sexual Activity   Alcohol Use Yes    Alcohol/week: 2.0 standard drinks of alcohol    Types: 2 Glasses of wine per week     Social History     Substance and Sexual Activity   Drug Use Never     Social History     Tobacco Use   Smoking Status Never   Smokeless Tobacco Never     No family history on file.    Meds/Allergies       Current Outpatient Medications:     albuterol (PROVENTIL HFA,VENTOLIN HFA) 90 mcg/act inhaler    ascorbic acid (VITAMIN C) 1000 MG tablet    chlorhexidine (PERIDEX) 0.12 % solution    Cholecalciferol 50 MCG (2000 UT) CAPS    levocetirizine (XYZAL) 5 MG tablet    levothyroxine 50 mcg tablet    meloxicam (MOBIC) 7.5 mg tablet    Omega-3 Fatty Acids (fish oil) 1,000 mg    sertraline (ZOLOFT) 50 mg tablet    traZODone (DESYREL) 50 mg tablet    valsartan-hydrochlorothiazide (DIOVAN-HCT) 160-12.5 MG per tablet    Wixela Inhub 500-50 MCG/ACT inhaler    halobetasol (ULTRAVATE) 0.05 % cream    MILK THISTLE PO    No Known Allergies    Objective     /76   Pulse 76   Temp (!) 97 °F (36.1 °C) (Temporal)   Resp 18   SpO2 92%       PHYSICAL EXAM    Gen: NAD  Head: NCAT  CV: RRR  CHEST: Clear  ABD: soft, NT/ND  EXT: no edema      ASSESSMENT/PLAN:  This is a 73 y.o. year old female here for colonoscopy, and she is stable and optimized for her procedure.

## 2024-08-28 NOTE — ANESTHESIA PREPROCEDURE EVALUATION
Procedure:  COLONOSCOPY    Relevant Problems   ANESTHESIA (within normal limits)      CARDIO   (+) HTN (hypertension)      PULMONARY   (+) Asthma        Physical Exam    Airway    Mallampati score: II  TM Distance: >3 FB  Neck ROM: full     Dental   Comment: Upper partial denture. upper dentures,     Cardiovascular  Rhythm: regular, Rate: normal    Pulmonary   Breath sounds clear to auscultation    Other Findings  post-pubertal.      Anesthesia Plan  ASA Score- 2     Anesthesia Type- IV sedation with anesthesia with ASA Monitors.         Additional Monitors:     Airway Plan:            Plan Factors-Exercise tolerance (METS): >4 METS.    Chart reviewed.    Patient summary reviewed.    Patient is not a current smoker.              Induction-     Postoperative Plan-         Informed Consent- Anesthetic plan and risks discussed with patient.  I personally reviewed this patient with the CRNA. Discussed and agreed on the Anesthesia Plan with the CRNA..

## 2024-09-03 PROCEDURE — 88305 TISSUE EXAM BY PATHOLOGIST: CPT | Performed by: PATHOLOGY

## 2024-09-03 NOTE — RESULT ENCOUNTER NOTE
Please call the patient regarding her colon biopsy, no significant tissue seen, repeat colonoscopy in 5 years.